# Patient Record
Sex: MALE | Race: WHITE | NOT HISPANIC OR LATINO | Employment: OTHER | URBAN - METROPOLITAN AREA
[De-identification: names, ages, dates, MRNs, and addresses within clinical notes are randomized per-mention and may not be internally consistent; named-entity substitution may affect disease eponyms.]

---

## 2017-05-16 ENCOUNTER — PRE VISIT (OUTPATIENT)
Dept: UROLOGY | Facility: CLINIC | Age: 52
End: 2017-05-16

## 2021-08-03 ENCOUNTER — APPOINTMENT (OUTPATIENT)
Dept: URBAN - METROPOLITAN AREA CLINIC 254 | Age: 56
Setting detail: DERMATOLOGY
End: 2021-08-03

## 2021-08-03 VITALS — HEIGHT: 71 IN | WEIGHT: 285 LBS

## 2021-08-03 DIAGNOSIS — L40.0 PSORIASIS VULGARIS: ICD-10-CM

## 2021-08-03 PROCEDURE — 99204 OFFICE O/P NEW MOD 45 MIN: CPT

## 2021-08-03 PROCEDURE — OTHER COUNSELING: OTHER

## 2021-08-03 PROCEDURE — OTHER PRESCRIPTION MEDICATION MANAGEMENT: OTHER

## 2021-08-03 PROCEDURE — OTHER MEDICATION COUNSELING: OTHER

## 2021-08-03 PROCEDURE — OTHER PRESCRIPTION: OTHER

## 2021-08-03 RX ORDER — DESONIDE 0.5 MG/G
0.05% CREAM TOPICAL BID
Qty: 1 | Refills: 1 | Status: ERX | COMMUNITY
Start: 2021-08-03

## 2021-08-03 RX ORDER — BETAMETHASONE DIPROPIONATE 0.5 MG/G
0.05% CREAM TOPICAL BID
Qty: 1 | Refills: 2 | Status: ERX | COMMUNITY
Start: 2021-08-03

## 2021-08-03 ASSESSMENT — LOCATION DETAILED DESCRIPTION DERM
LOCATION DETAILED: PERIUMBILICAL SKIN
LOCATION DETAILED: RIGHT PROXIMAL PRETIBIAL REGION
LOCATION DETAILED: LEFT PROXIMAL PRETIBIAL REGION
LOCATION DETAILED: LEFT PROXIMAL DORSAL FOREARM
LOCATION DETAILED: RIGHT PROXIMAL DORSAL FOREARM

## 2021-08-03 ASSESSMENT — LOCATION ZONE DERM
LOCATION ZONE: LEG
LOCATION ZONE: TRUNK
LOCATION ZONE: ARM

## 2021-08-03 ASSESSMENT — LOCATION SIMPLE DESCRIPTION DERM
LOCATION SIMPLE: LEFT FOREARM
LOCATION SIMPLE: ABDOMEN
LOCATION SIMPLE: LEFT PRETIBIAL REGION
LOCATION SIMPLE: RIGHT FOREARM
LOCATION SIMPLE: RIGHT PRETIBIAL REGION

## 2021-08-03 ASSESSMENT — BSA PSORIASIS: % BODY COVERED IN PSORIASIS: 30

## 2021-08-03 NOTE — HPI: RASH
Is The Patient Presenting As Previously Scheduled?: Yes
How Severe Is Your Rash?: moderate
Is This A New Presentation, Or A Follow-Up?: Rash
Additional History: Patient reports family history of psoriasis; brother.

## 2021-08-03 NOTE — PROCEDURE: PRESCRIPTION MEDICATION MANAGEMENT
Render In Strict Bullet Format?: No
Detail Level: Zone
Initiate Treatment: Betamethasone 0.05% Cream BID to affected areas. May Saran Wrap to plaques post application, only leaving on for 1 hour a day. Discussed avoiding any trauma to skin, including loofa use. Discussed doing the biopsy. Patient chooses to perform a biopsy later if worsens

## 2021-09-15 ENCOUNTER — APPOINTMENT (OUTPATIENT)
Dept: URBAN - METROPOLITAN AREA CLINIC 270 | Age: 56
Setting detail: DERMATOLOGY
End: 2021-09-15

## 2022-02-07 ENCOUNTER — APPOINTMENT (OUTPATIENT)
Dept: URBAN - METROPOLITAN AREA CLINIC 254 | Age: 57
Setting detail: DERMATOLOGY
End: 2022-02-08

## 2022-02-07 VITALS — WEIGHT: 275 LBS | RESPIRATION RATE: 14 BRPM

## 2022-02-07 DIAGNOSIS — L43.8 OTHER LICHEN PLANUS: ICD-10-CM

## 2022-02-07 PROCEDURE — 99214 OFFICE O/P EST MOD 30 MIN: CPT

## 2022-02-07 PROCEDURE — OTHER COUNSELING: OTHER

## 2022-02-07 PROCEDURE — OTHER MEDICATION COUNSELING: OTHER

## 2022-02-07 PROCEDURE — OTHER PRESCRIPTION: OTHER

## 2022-02-07 RX ORDER — DESONIDE 0.5 MG/G
0.05% CREAM TOPICAL
Qty: 15 | Refills: 3 | Status: ERX

## 2022-02-07 RX ORDER — BETAMETHASONE DIPROPIONATE 0.5 MG/G
CREAM TOPICAL
Qty: 45 | Refills: 5 | Status: ERX

## 2022-02-07 ASSESSMENT — LOCATION ZONE DERM: LOCATION ZONE: HAND

## 2022-02-07 ASSESSMENT — LOCATION DETAILED DESCRIPTION DERM
LOCATION DETAILED: 3RD WEB SPACE RIGHT HAND
LOCATION DETAILED: 3RD WEB SPACE LEFT HAND

## 2022-02-07 ASSESSMENT — LOCATION SIMPLE DESCRIPTION DERM
LOCATION SIMPLE: LEFT HAND
LOCATION SIMPLE: RIGHT HAND

## 2022-02-07 NOTE — PROCEDURE: MEDICATION COUNSELING
Patient Education     Prevention Guidelines, Men Ages 40 to 49  Screening tests and vaccines are an important part of managing your health. A screening test is done to find possible disorders or diseases in people who don't have any symptoms. The goal is to find a disease early so lifestyle changes can be made and you can be watched more closely to reduce the risk of disease, or to detect it early enough to treat it most effectively. Screening tests are not considered diagnostic, but are used to determine if more testing is needed. Health counseling is essential, too. Below are guidelines for these, for men ages 40 to 49. Talk with your healthcare provider to make sure you’re up to date on what you need.  Screening Who needs it How often   Alcohol misuse All men in this age group At routine exams   Blood pressure All men in this age group Yearly checkup if your blood pressure reading is normal  Normal blood pressure is less than 120/80 mm Hg  If your blood pressure is higher than normal, follow the advice of your healthcare provider      Depression All men in this age group At routine exams   Type 2 diabetes or prediabetes All men beginning at age 45 and men  without symptoms at any age who are overweight or obese and have 1 or more other risk factors for diabetes At least every 3 years (yearly if blood sugar has begun to rise)   Type 2 diabetes All men with prediabetes Every year   Hepatitis C Men at increased risk for infection - talk with your healthcare provider At routine exams   High cholesterol or triglycerides All men ages 35 and older, and younger men at high risk for coronary artery disease At least every 5 years   HIV All men At routine exams   Obesity All men in this age group At routine exams   Prostate cancer Starting at age 45, talk to healthcare provider about risks and benefits of digital rectal exam (DALTON) and prostate-specific antigen (PSA) screening1 At routine exams   Syphilis Men at increased  risk for infection - talk with your healthcare provider At routine exams   Tuberculosis Men at increased risk for infection - talk with your healthcare provider Check with your healthcare provider   Vision All men in this age group Every 2 to 4 years if no risk factors for eye disease2   Vaccine Who needs it How often   Chickenpox (varicella) All men in this age group who have no record of this infection or vaccine 2 doses; the second dose should be given at least 4 weeks after the first dose   Hepatitis A Men at increased risk for infection - talk with your healthcare provider 2 doses given at least 6 months apart   Hepatitis B Men at increased risk for infection - talk with your healthcare provider 3 doses over 6 months; second dose should be given 1 month after the first dose; the third dose should be given at least 2 months after the second dose and at least 4 months after the first dose   Haemophilus influenzae Type B (HIB) Men at increased risk for infection - talk with your healthcare provider 1 to 3 doses   Influenza (flu) All men in this age group Once a year   Measles, mumps, rubella (MMR) All men in this age group who have no record of these infections or vaccines 1 or 2 doses   Meningococcal Men at increased risk for infection - talk with your healthcare provider 1 or more doses   Pneumococcal conjugate vaccine (PCV13) and pneumococcal polysaccharide vaccine (PPSV23) Men at increased risk for infection - talk with your healthcare provider PCV13: 1 dose ages 19 to 65 (protects against 13 types of pneumococcal bacteria)     PPSV23: 1 to 2 doses through age 64, or 1 dose at 65 or older (protects against 23 types of pneumococcal bacteria)      Tetanus/diphtheria/  pertussis (Td/Tdap) booster All men in this age group Td every 10 years, or a one-time dose of Tdap instead of a Td booster after age 18, then Td every 10 years   Counseling Who needs it How often   Diet and exercise Men who are overweight or obese  When diagnosed, and then at routine exams   Sexually transmitted infection prevention Men at increased risk for infection - talk with your healthcare provider At routine exams   Use of daily aspirin Men ages 45 to 79 at risk for cardiovascular health problems At routine exams   Use of tobacco and the health effects it can cause All men in this age group Every exam   [address] [place] [address] [facility]   [facility] [employer] [facility] [employer]  Date Last Reviewed: 2/1/2017  © 9636-1122 [employer]. [address] [place] [address], [place] [address]. All rights reserved. This information is not intended as a substitute for professional medical care. Always follow your healthcare professional's instructions.            Terbinafine Pregnancy And Lactation Text: This medication is Pregnancy Category B and is considered safe during pregnancy. It is also excreted in breast milk and breast feeding isn't recommended.

## 2022-02-07 NOTE — HPI: RASH (PSORIASIS)
Is This A New Presentation, Or A Follow-Up?: Psoriasis
Additional History: Improved with desonide and betamethasone.  Uses these for 1 month and resolved and has not come back. Backs of hands started flaring 2 weeks ago and improved a little with desonide. Had exposure to hydolic fluid 3 weeks ago and flared after this.

## 2022-02-07 NOTE — PROCEDURE: COUNSELING
Detail Level: Detailed
Patient Specific Counseling (Will Not Stick From Patient To Patient): -Recommend pt uses the same topical steroid prescribed to him since affected areas were healing.\\n-Pt stated that the creams did work but ran out and wanted more.

## 2022-05-16 ENCOUNTER — APPOINTMENT (OUTPATIENT)
Dept: URBAN - METROPOLITAN AREA CLINIC 259 | Age: 57
Setting detail: DERMATOLOGY
End: 2022-05-16

## 2022-05-16 DIAGNOSIS — L29.8 OTHER PRURITUS: ICD-10-CM

## 2022-05-16 DIAGNOSIS — L21.8 OTHER SEBORRHEIC DERMATITIS: ICD-10-CM

## 2022-05-16 PROCEDURE — OTHER PRESCRIPTION: OTHER

## 2022-05-16 PROCEDURE — 99214 OFFICE O/P EST MOD 30 MIN: CPT

## 2022-05-16 PROCEDURE — OTHER MIPS QUALITY: OTHER

## 2022-05-16 PROCEDURE — OTHER COUNSELING: OTHER

## 2022-05-16 RX ORDER — HYDROCORTISONE 25 MG/G
CREAM TOPICAL
Qty: 2 | Refills: 1 | Status: ERX | COMMUNITY
Start: 2022-05-16

## 2022-05-16 RX ORDER — KETOCONAZOLE 20 MG/G
CREAM TOPICAL
Qty: 1 | Refills: 7 | Status: ERX | COMMUNITY
Start: 2022-05-16

## 2022-05-16 ASSESSMENT — LOCATION DETAILED DESCRIPTION DERM
LOCATION DETAILED: GLABELLA
LOCATION DETAILED: LEFT CENTRAL MALAR CHEEK

## 2022-05-16 ASSESSMENT — LOCATION SIMPLE DESCRIPTION DERM
LOCATION SIMPLE: GLABELLA
LOCATION SIMPLE: LEFT CHEEK

## 2022-05-16 ASSESSMENT — LOCATION ZONE DERM: LOCATION ZONE: FACE

## 2022-05-16 NOTE — HPI: RASH
What Type Of Note Output Would You Prefer (Optional)?: Standard Output
How Severe Is Your Rash?: moderate
Is This A New Presentation, Or A Follow-Up?: Rash
Additional History: Patient states a new rash appeared on his forehead about a week ago. It’s bothersome, and he is here seeking treatment options.

## 2024-02-11 ENCOUNTER — TRANSFERRED RECORDS (OUTPATIENT)
Dept: MULTI SPECIALTY CLINIC | Facility: CLINIC | Age: 59
End: 2024-02-11

## 2024-02-22 ENCOUNTER — MEDICAL CORRESPONDENCE (OUTPATIENT)
Dept: HEALTH INFORMATION MANAGEMENT | Facility: CLINIC | Age: 59
End: 2024-02-22
Payer: COMMERCIAL

## 2024-02-22 ENCOUNTER — HOSPITAL ENCOUNTER (INPATIENT)
Facility: CLINIC | Age: 59
Setting detail: SURGERY ADMIT
End: 2024-02-22
Attending: ORTHOPAEDIC SURGERY | Admitting: ORTHOPAEDIC SURGERY
Payer: COMMERCIAL

## 2024-02-29 ENCOUNTER — ANESTHESIA EVENT (OUTPATIENT)
Dept: SURGERY | Facility: CLINIC | Age: 59
End: 2024-02-29
Payer: COMMERCIAL

## 2024-02-29 RX ORDER — EZETIMIBE 10 MG/1
10 TABLET ORAL DAILY
COMMUNITY
End: 2024-02-29 | Stop reason: HOSPADM

## 2024-02-29 RX ORDER — LISINOPRIL 20 MG/1
20 TABLET ORAL DAILY
COMMUNITY
End: 2024-02-29 | Stop reason: HOSPADM

## 2024-02-29 RX ORDER — ASPIRIN 81 MG/1
81 TABLET ORAL DAILY
COMMUNITY
End: 2024-02-29 | Stop reason: HOSPADM

## 2024-02-29 RX ORDER — SODIUM CHLORIDE, SODIUM LACTATE, POTASSIUM CHLORIDE, CALCIUM CHLORIDE 600; 310; 30; 20 MG/100ML; MG/100ML; MG/100ML; MG/100ML
INJECTION, SOLUTION INTRAVENOUS CONTINUOUS
Status: CANCELLED | OUTPATIENT
Start: 2024-02-29

## 2024-02-29 RX ORDER — ROSUVASTATIN CALCIUM 20 MG/1
20 TABLET, COATED ORAL DAILY
COMMUNITY
End: 2024-02-29 | Stop reason: HOSPADM

## 2024-02-29 NOTE — PROGRESS NOTES
PTA medications updated by Medication Scribe prior to surgery via phone call with patient (last doses completed by Nurse)     Medication history sources: Patient and H&P  In the past week, patient estimated taking medication this percent of the time: Greater than 90%      Significant changes made to the medication list:  None      Additional medication history information:   None    Medication reconciliation completed by provider prior to medication history? No    Time spent in this activity: 30 minutes    The information provided in this note is only as accurate as the sources available at the time of update(s)      Prior to Admission medications    Medication Sig Last Dose Taking? Auth Provider Long Term End Date   aspirin 81 MG EC tablet Take 81 mg by mouth daily 02/24/2024 at am Yes Reported, Patient     ezetimibe (ZETIA) 10 MG tablet Take 10 mg by mouth daily 03/01/2024 at am Yes Reported, Patient Yes    lisinopril (ZESTRIL) 20 MG tablet Take 20 mg by mouth daily 02/29/2024 at am Yes Reported, Patient Yes    METOPROLOL TARTRATE PO Take 25 mg by mouth every morning 03/01/2024 at am Yes Reported, Patient Yes    Omega-3 Fatty Acids (OMEGA-3 FISH OIL PO)  More than a month Yes Reported, Patient     rosuvastatin (CRESTOR) 20 MG tablet Take 20 mg by mouth daily 03/01/2024 at am Yes Reported, Patient Yes    TAURINE PO  More than a month Yes Reported, Patient         Medication history completed by: Adrianna Marrero

## 2024-03-01 ENCOUNTER — ANESTHESIA (OUTPATIENT)
Dept: SURGERY | Facility: CLINIC | Age: 59
End: 2024-03-01
Payer: COMMERCIAL

## 2024-03-04 ENCOUNTER — APPOINTMENT (OUTPATIENT)
Dept: URBAN - METROPOLITAN AREA CLINIC 254 | Age: 59
Setting detail: DERMATOLOGY
End: 2024-03-04

## 2024-03-04 VITALS — HEIGHT: 71 IN | WEIGHT: 275 LBS

## 2024-03-04 DIAGNOSIS — L21.8 OTHER SEBORRHEIC DERMATITIS: ICD-10-CM

## 2024-03-04 DIAGNOSIS — L40.0 PSORIASIS VULGARIS: ICD-10-CM

## 2024-03-04 PROCEDURE — OTHER PHOTO-DOCUMENTATION: OTHER

## 2024-03-04 PROCEDURE — 99214 OFFICE O/P EST MOD 30 MIN: CPT

## 2024-03-04 PROCEDURE — OTHER PRESCRIPTION MEDICATION MANAGEMENT: OTHER

## 2024-03-04 PROCEDURE — OTHER PRESCRIPTION: OTHER

## 2024-03-04 PROCEDURE — OTHER COUNSELING: OTHER

## 2024-03-04 PROCEDURE — OTHER MEDICATION COUNSELING: OTHER

## 2024-03-04 PROCEDURE — OTHER MIPS QUALITY: OTHER

## 2024-03-04 RX ORDER — HYDROCORTISONE 25 MG/G
CREAM TOPICAL
Qty: 2 | Refills: 2 | Status: ERX

## 2024-03-04 RX ORDER — BETAMETHASONE DIPROPIONATE 0.5 MG/G
CREAM TOPICAL
Qty: 45 | Refills: 2 | Status: ERX

## 2024-03-04 RX ORDER — KETOCONAZOLE 20 MG/G
CREAM TOPICAL
Qty: 1 | Refills: 2 | Status: ERX

## 2024-03-04 ASSESSMENT — LOCATION SIMPLE DESCRIPTION DERM
LOCATION SIMPLE: RIGHT FOREHEAD
LOCATION SIMPLE: RIGHT FOREARM
LOCATION SIMPLE: LEFT CHEEK

## 2024-03-04 ASSESSMENT — LOCATION DETAILED DESCRIPTION DERM
LOCATION DETAILED: LEFT CENTRAL MALAR CHEEK
LOCATION DETAILED: RIGHT PROXIMAL DORSAL FOREARM
LOCATION DETAILED: RIGHT INFERIOR MEDIAL FOREHEAD
LOCATION DETAILED: RIGHT SUPERIOR LATERAL FOREHEAD

## 2024-03-04 ASSESSMENT — BSA PSORIASIS: % BODY COVERED IN PSORIASIS: 5

## 2024-03-04 ASSESSMENT — LOCATION ZONE DERM
LOCATION ZONE: FACE
LOCATION ZONE: ARM

## 2024-03-04 NOTE — PROCEDURE: PRESCRIPTION MEDICATION MANAGEMENT
Detail Level: Zone
Render In Strict Bullet Format?: No
Continue Regimen: ketoconazole 2 % topical cream w/ hydrocortisone 2.5% cream- mix in 1:1 ratio
Continue Regimen: betamethasone dipropionate 0.05 % cream BID for flares

## 2024-03-04 NOTE — PROCEDURE: MEDICATION COUNSELING
Propranolol Pregnancy And Lactation Text: This medication is Pregnancy Category C and it isn't known if it is safe during pregnancy. It is excreted in breast milk.
Wartpeel Pregnancy And Lactation Text: This medication is Pregnancy Category X and contraindicated in pregnancy and in women who may become pregnant. It is unknown if this medication is excreted in breast milk.
Erythromycin Pregnancy And Lactation Text: This medication is Pregnancy Category B and is considered safe during pregnancy. It is also excreted in breast milk.
Niacinamide Pregnancy And Lactation Text: These medications are considered safe during pregnancy.
Cyclosporine Pregnancy And Lactation Text: This medication is Pregnancy Category C and it isn't know if it is safe during pregnancy. This medication is excreted in breast milk.
Gabapentin Counseling: I discussed with the patient the risks of gabapentin including but not limited to dizziness, somnolence, fatigue and ataxia.
Cellcept Pregnancy And Lactation Text: This medication is Pregnancy Category D and isn't considered safe during pregnancy. It is unknown if this medication is excreted in breast milk.
Cimzia Counseling:  I discussed with the patient the risks of Cimzia including but not limited to immunosuppression, allergic reactions and infections.  The patient understands that monitoring is required including a PPD at baseline and must alert us or the primary physician if symptoms of infection or other concerning signs are noted.
Enbrel Counseling:  I discussed with the patient the risks of etanercept including but not limited to myelosuppression, immunosuppression, autoimmune hepatitis, demyelinating diseases, lymphoma, and infections.  The patient understands that monitoring is required including a PPD at baseline and must alert us or the primary physician if symptoms of infection or other concerning signs are noted.
Ivermectin Counseling:  Patient instructed to take medication on an empty stomach with a full glass of water.  Patient informed of potential adverse effects including but not limited to nausea, diarrhea, dizziness, itching, and swelling of the extremities or lymph nodes.  The patient verbalized understanding of the proper use and possible adverse effects of ivermectin.  All of the patient's questions and concerns were addressed.
Libtayo Counseling- I discussed with the patient the risks of Libtayo including but not limited to nausea, vomiting, diarrhea, and bone or muscle pain.  The patient verbalized understanding of the proper use and possible adverse effects of Libtayo.  All of the patient's questions and concerns were addressed.
Rifampin Pregnancy And Lactation Text: This medication is Pregnancy Category C and it isn't know if it is safe during pregnancy. It is also excreted in breast milk and should not be used if you are breast feeding.
Siliq Counseling:  I discussed with the patient the risks of Siliq including but not limited to new or worsening depression, suicidal thoughts and behavior, immunosuppression, malignancy, posterior leukoencephalopathy syndrome, and serious infections.  The patient understands that monitoring is required including a PPD at baseline and must alert us or the primary physician if symptoms of infection or other concerning signs are noted. There is also a special program designed to monitor depression which is required with Siliq.
Cellcept Counseling:  I discussed with the patient the risks of mycophenolate mofetil including but not limited to infection/immunosuppression, GI upset, hypokalemia, hypercholesterolemia, bone marrow suppression, lymphoproliferative disorders, malignancy, GI ulceration/bleed/perforation, colitis, interstitial lung disease, kidney failure, progressive multifocal leukoencephalopathy, and birth defects.  The patient understands that monitoring is required including a baseline creatinine and regular CBC testing. In addition, patient must alert us immediately if symptoms of infection or other concerning signs are noted.
Clofazimine Pregnancy And Lactation Text: This medication is Pregnancy Category C and isn't considered safe during pregnancy. It is excreted in breast milk.
Xolair Pregnancy And Lactation Text: This medication is Pregnancy Category B and is considered safe during pregnancy. This medication is excreted in breast milk.
Odomzo Counseling- I discussed with the patient the risks of Odomzo including but not limited to nausea, vomiting, diarrhea, constipation, weight loss, changes in the sense of taste, decreased appetite, muscle spasms, and hair loss.  The patient verbalized understanding of the proper use and possible adverse effects of Odomzo.  All of the patient's questions and concerns were addressed.
Valtrex Counseling: I discussed with the patient the risks of valacyclovir including but not limited to kidney damage, nausea, vomiting and severe allergy.  The patient understands that if the infection seems to be worsening or is not improving, they are to call.
Thalidomide Counseling: I discussed with the patient the risks of thalidomide including but not limited to birth defects, anxiety, weakness, chest pain, dizziness, cough and severe allergy.
Benzoyl Peroxide Pregnancy And Lactation Text: This medication is Pregnancy Category C. It is unknown if benzoyl peroxide is excreted in breast milk.
Nsaids Counseling: NSAID Counseling: I discussed with the patient that NSAIDs should be taken with food. Prolonged use of NSAIDs can result in the development of stomach ulcers.  Patient advised to stop taking NSAIDs if abdominal pain occurs.  The patient verbalized understanding of the proper use and possible adverse effects of NSAIDs.  All of the patient's questions and concerns were addressed.
Birth Control Pills Pregnancy And Lactation Text: This medication should be avoided if pregnant and for the first 30 days post-partum.
Topical Clindamycin Counseling: Patient counseled that this medication may cause skin irritation or allergic reactions.  In the event of skin irritation, the patient was advised to reduce the amount of the drug applied or use it less frequently.   The patient verbalized understanding of the proper use and possible adverse effects of clindamycin.  All of the patient's questions and concerns were addressed.
Odomzo Pregnancy And Lactation Text: This medication is Pregnancy Category X and is absolutely contraindicated during pregnancy. It is unknown if it is excreted in breast milk.
Picato Counseling:  I discussed with the patient the risks of Picato including but not limited to erythema, scaling, itching, weeping, crusting, and pain.
Protopic Counseling: Patient may experience a mild burning sensation during topical application. Protopic is not approved in children less than 2 years of age. There have been case reports of hematologic and skin malignancies in patients using topical calcineurin inhibitors although causality is questionable.
Rituxan Counseling:  I discussed with the patient the risks of Rituxan infusions. Side effects can include infusion reactions, severe drug rashes including mucocutaneous reactions, reactivation of latent hepatitis and other infections and rarely progressive multifocal leukoencephalopathy.  All of the patient's questions and concerns were addressed.
Sarecycline Counseling: Patient advised regarding possible photosensitivity and discoloration of the teeth, skin, lips, tongue and gums.  Patient instructed to avoid sunlight, if possible.  When exposed to sunlight, patients should wear protective clothing, sunglasses, and sunscreen.  The patient was instructed to call the office immediately if the following severe adverse effects occur:  hearing changes, easy bruising/bleeding, severe headache, or vision changes.  The patient verbalized understanding of the proper use and possible adverse effects of sarecycline.  All of the patient's questions and concerns were addressed.
Topical Retinoid Pregnancy And Lactation Text: This medication is Pregnancy Category C. It is unknown if this medication is excreted in breast milk.
Azithromycin Counseling:  I discussed with the patient the risks of azithromycin including but not limited to GI upset, allergic reaction, drug rash, diarrhea, and yeast infections.
Nsaids Pregnancy And Lactation Text: These medications are considered safe up to 30 weeks gestation. It is excreted in breast milk.
Cephalexin Pregnancy And Lactation Text: This medication is Pregnancy Category B and considered safe during pregnancy.  It is also excreted in breast milk but can be used safely for shorter doses.
Otezla Pregnancy And Lactation Text: This medication is Pregnancy Category C and it isn't known if it is safe during pregnancy. It is unknown if it is excreted in breast milk.
Clofazimine Counseling:  I discussed with the patient the risks of clofazimine including but not limited to skin and eye pigmentation, liver damage, nausea/vomiting, gastrointestinal bleeding and allergy.
Oxybutynin Counseling:  I discussed with the patient the risks of oxybutynin including but not limited to skin rash, drowsiness, dry mouth, difficulty urinating, and blurred vision.
High Dose Vitamin A Pregnancy And Lactation Text: High dose vitamin A therapy is contraindicated during pregnancy and breast feeding.
Use Enhanced Medication Counseling?: No
Itraconazole Pregnancy And Lactation Text: This medication is Pregnancy Category C and it isn't know if it is safe during pregnancy. It is also excreted in breast milk.
Erythromycin Counseling:  I discussed with the patient the risks of erythromycin including but not limited to GI upset, allergic reaction, drug rash, diarrhea, increase in liver enzymes, and yeast infections.
Clindamycin Pregnancy And Lactation Text: This medication can be used in pregnancy if certain situations. Clindamycin is also present in breast milk.
Spironolactone Counseling: Patient advised regarding risks of diarrhea, abdominal pain, hyperkalemia, birth defects (for female patients), liver toxicity and renal toxicity. The patient may need blood work to monitor liver and kidney function and potassium levels while on therapy. The patient verbalized understanding of the proper use and possible adverse effects of spironolactone.  All of the patient's questions and concerns were addressed.
Terbinafine Pregnancy And Lactation Text: This medication is Pregnancy Category B and is considered safe during pregnancy. It is also excreted in breast milk and breast feeding isn't recommended.
Cyclosporine Counseling:  I discussed with the patient the risks of cyclosporine including but not limited to hypertension, gingival hyperplasia,myelosuppression, immunosuppression, liver damage, kidney damage, neurotoxicity, lymphoma, and serious infections. The patient understands that monitoring is required including baseline blood pressure, CBC, CMP, lipid panel and uric acid, and then 1-2 times monthly CMP and blood pressure.
Clindamycin Counseling: I counseled the patient regarding use of clindamycin as an antibiotic for prophylactic and/or therapeutic purposes. Clindamycin is active against numerous classes of bacteria, including skin bacteria. Side effects may include nausea, diarrhea, gastrointestinal upset, rash, hives, yeast infections, and in rare cases, colitis.
Tremfya Counseling: I discussed with the patient the risks of guselkumab including but not limited to immunosuppression, serious infections, and drug reactions.  The patient understands that monitoring is required including a PPD at baseline and must alert us or the primary physician if symptoms of infection or other concerning signs are noted.
Spironolactone Pregnancy And Lactation Text: This medication can cause feminization of the male fetus and should be avoided during pregnancy. The active metabolite is also found in breast milk.
Tazorac Counseling:  Patient advised that medication is irritating and drying.  Patient may need to apply sparingly and wash off after an hour before eventually leaving it on overnight.  The patient verbalized understanding of the proper use and possible adverse effects of tazorac.  All of the patient's questions and concerns were addressed.
Protopic Pregnancy And Lactation Text: This medication is Pregnancy Category C. It is unknown if this medication is excreted in breast milk when applied topically.
Bactrim Pregnancy And Lactation Text: This medication is Pregnancy Category D and is known to cause fetal risk.  It is also excreted in breast milk.
Cosentyx Counseling:  I discussed with the patient the risks of Cosentyx including but not limited to worsening of Crohn's disease, immunosuppression, allergic reactions and infections.  The patient understands that monitoring is required including a PPD at baseline and must alert us or the primary physician if symptoms of infection or other concerning signs are noted.
Hydroxychloroquine Counseling:  I discussed with the patient that a baseline ophthalmologic exam is needed at the start of therapy and every year thereafter while on therapy. A CBC may also be warranted for monitoring.  The side effects of this medication were discussed with the patient, including but not limited to agranulocytosis, aplastic anemia, seizures, rashes, retinopathy, and liver toxicity. Patient instructed to call the office should any adverse effect occur.  The patient verbalized understanding of the proper use and possible adverse effects of Plaquenil.  All the patient's questions and concerns were addressed.
Rifampin Counseling: I discussed with the patient the risks of rifampin including but not limited to liver damage, kidney damage, red-orange body fluids, nausea/vomiting and severe allergy.
Otezla Counseling: The side effects of Otezla were discussed with the patient, including but not limited to worsening or new depression, weight loss, diarrhea, nausea, upper respiratory tract infection, and headache. Patient instructed to call the office should any adverse effect occur.  The patient verbalized understanding of the proper use and possible adverse effects of Otezla.  All the patient's questions and concerns were addressed.
Griseofulvin Counseling:  I discussed with the patient the risks of griseofulvin including but not limited to photosensitivity, cytopenia, liver damage, nausea/vomiting and severe allergy.  The patient understands that this medication is best absorbed when taken with a fatty meal (e.g., ice cream or french fries).
Xolair Counseling:  Patient informed of potential adverse effects including but not limited to fever, muscle aches, rash and allergic reactions.  The patient verbalized understanding of the proper use and possible adverse effects of Xolair.  All of the patient's questions and concerns were addressed.
Tremfya Pregnancy And Lactation Text: The risk during pregnancy and breastfeeding is uncertain with this medication.
Tetracycline Pregnancy And Lactation Text: This medication is Pregnancy Category D and not consider safe during pregnancy. It is also excreted in breast milk.
Solaraze Pregnancy And Lactation Text: This medication is Pregnancy Category B and is considered safe. There is some data to suggest avoiding during the third trimester. It is unknown if this medication is excreted in breast milk.
Wartpeel Counseling:  I discussed with the patient the risks of Wartpeel including but not limited to erythema, scaling, itching, weeping, crusting, and pain.
Doxycycline Counseling:  Patient counseled regarding possible photosensitivity and increased risk for sunburn.  Patient instructed to avoid sunlight, if possible.  When exposed to sunlight, patients should wear protective clothing, sunglasses, and sunscreen.  The patient was instructed to call the office immediately if the following severe adverse effects occur:  hearing changes, easy bruising/bleeding, severe headache, or vision changes.  The patient verbalized understanding of the proper use and possible adverse effects of doxycycline.  All of the patient's questions and concerns were addressed.
Acitretin Counseling:  I discussed with the patient the risks of acitretin including but not limited to hair loss, dry lips/skin/eyes, liver damage, hyperlipidemia, depression/suicidal ideation, photosensitivity.  Serious rare side effects can include but are not limited to pancreatitis, pseudotumor cerebri, bony changes, clot formation/stroke/heart attack.  Patient understands that alcohol is contraindicated since it can result in liver toxicity and significantly prolong the elimination of the drug by many years.
Tetracycline Counseling: Patient counseled regarding possible photosensitivity and increased risk for sunburn.  Patient instructed to avoid sunlight, if possible.  When exposed to sunlight, patients should wear protective clothing, sunglasses, and sunscreen.  The patient was instructed to call the office immediately if the following severe adverse effects occur:  hearing changes, easy bruising/bleeding, severe headache, or vision changes.  The patient verbalized understanding of the proper use and possible adverse effects of tetracycline.  All of the patient's questions and concerns were addressed. Patient understands to avoid pregnancy while on therapy due to potential birth defects.
Taltz Counseling: I discussed with the patient the risks of ixekizumab including but not limited to immunosuppression, serious infections, worsening of inflammatory bowel disease and drug reactions.  The patient understands that monitoring is required including a PPD at baseline and must alert us or the primary physician if symptoms of infection or other concerning signs are noted.
Calcipotriene Pregnancy And Lactation Text: This medication has not been proven safe during pregnancy. It is unknown if this medication is excreted in breast milk.
Isotretinoin Counseling: Patient should get monthly blood tests, not donate blood, not drive at night if vision affected, not share medication, and not undergo elective surgery for 6 months after tx completed. Side effects reviewed, pt to contact office should one occur.
Drysol Pregnancy And Lactation Text: This medication is considered safe during pregnancy and breast feeding.
Cyclophosphamide Pregnancy And Lactation Text: This medication is Pregnancy Category D and it isn't considered safe during pregnancy. This medication is excreted in breast milk.
Minoxidil Pregnancy And Lactation Text: This medication has not been assigned a Pregnancy Risk Category but animal studies failed to show danger with the topical medication. It is unknown if the medication is excreted in breast milk.
Ivermectin Pregnancy And Lactation Text: This medication is Pregnancy Category C and it isn't known if it is safe during pregnancy. It is also excreted in breast milk.
Ketoconazole Counseling:   Patient counseled regarding improving absorption with orange juice.  Adverse effects include but are not limited to breast enlargement, headache, diarrhea, nausea, upset stomach, liver function test abnormalities, taste disturbance, and stomach pain.  There is a rare possibility of liver failure that can occur when taking ketoconazole. The patient understands that monitoring of LFTs may be required, especially at baseline. The patient verbalized understanding of the proper use and possible adverse effects of ketoconazole.  All of the patient's questions and concerns were addressed.
Acitretin Pregnancy And Lactation Text: This medication is Pregnancy Category X and should not be given to women who are pregnant or may become pregnant in the future. This medication is excreted in breast milk.
Humira Pregnancy And Lactation Text: This medication is Pregnancy Category B and is considered safe during pregnancy. It is unknown if this medication is excreted in breast milk.
Detail Level: Simple
Arava Counseling:  Patient counseled regarding adverse effects of Arava including but not limited to nausea, vomiting, abnormalities in liver function tests. Patients may develop mouth sores, rash, diarrhea, and abnormalities in blood counts. The patient understands that monitoring is required including LFTs and blood counts.  There is a rare possibility of scarring of the liver and lung problems that can occur when taking methotrexate. Persistent nausea, loss of appetite, pale stools, dark urine, cough, and shortness of breath should be reported immediately. Patient advised to discontinue Arava treatment and consult with a physician prior to attempting conception. The patient will have to undergo a treatment to eliminate Arava from the body prior to conception.
Azathioprine Counseling:  I discussed with the patient the risks of azathioprine including but not limited to myelosuppression, immunosuppression, hepatotoxicity, lymphoma, and infections.  The patient understands that monitoring is required including baseline LFTs, Creatinine, possible TPMP genotyping and weekly CBCs for the first month and then every 2 weeks thereafter.  The patient verbalized understanding of the proper use and possible adverse effects of azathioprine.  All of the patient's questions and concerns were addressed.
Tranexamic Acid Pregnancy And Lactation Text: It is unknown if this medication is safe during pregnancy or breast feeding.
Hydroxyzine Pregnancy And Lactation Text: This medication is not safe during pregnancy and should not be taken. It is also excreted in breast milk and breast feeding isn't recommended.
Prednisone Counseling:  I discussed with the patient the risks of prolonged use of prednisone including but not limited to weight gain, insomnia, osteoporosis, mood changes, diabetes, susceptibility to infection, glaucoma and high blood pressure.  In cases where prednisone use is prolonged, patients should be monitored with blood pressure checks, serum glucose levels and an eye exam.  Additionally, the patient may need to be placed on GI prophylaxis, PCP prophylaxis, and calcium and vitamin D supplementation and/or a bisphosphonate.  The patient verbalized understanding of the proper use and the possible adverse effects of prednisone.  All of the patient's questions and concerns were addressed.
Drysol Counseling:  I discussed with the patient the risks of drysol/aluminum chloride including but not limited to skin rash, itching, irritation, burning.
Humira Counseling:  I discussed with the patient the risks of adalimumab including but not limited to myelosuppression, immunosuppression, autoimmune hepatitis, demyelinating diseases, lymphoma, and serious infections.  The patient understands that monitoring is required including a PPD at baseline and must alert us or the primary physician if symptoms of infection or other concerning signs are noted.
Ilumya Counseling: I discussed with the patient the risks of tildrakizumab including but not limited to immunosuppression, malignancy, posterior leukoencephalopathy syndrome, and serious infections.  The patient understands that monitoring is required including a PPD at baseline and must alert us or the primary physician if symptoms of infection or other concerning signs are noted.
Valtrex Pregnancy And Lactation Text: this medication is Pregnancy Category B and is considered safe during pregnancy. This medication is not directly found in breast milk but it's metabolite acyclovir is present.
Dupixent Pregnancy And Lactation Text: This medication likely crosses the placenta but the risk for the fetus is uncertain. This medication is excreted in breast milk.
Eucrisa Counseling: Patient may experience a mild burning sensation during topical application. Eucrisa is not approved in children less than 2 years of age.
High Dose Vitamin A Counseling: Side effects reviewed, pt to contact office should one occur.
Azithromycin Pregnancy And Lactation Text: This medication is considered safe during pregnancy and is also secreted in breast milk.
Doxepin Pregnancy And Lactation Text: This medication is Pregnancy Category C and it isn't known if it is safe during pregnancy. It is also excreted in breast milk and breast feeding isn't recommended.
Stelara Counseling:  I discussed with the patient the risks of ustekinumab including but not limited to immunosuppression, malignancy, posterior leukoencephalopathy syndrome, and serious infections.  The patient understands that monitoring is required including a PPD at baseline and must alert us or the primary physician if symptoms of infection or other concerning signs are noted.
Finasteride Male Counseling: Finasteride Counseling:  I discussed with the patient the risks of use of finasteride including but not limited to decreased libido, decreased ejaculate volume, gynecomastia, and depression. Women should not handle medication.  All of the patient's questions and concerns were addressed.
Benzoyl Peroxide Counseling: Patient counseled that medicine may cause skin irritation and bleach clothing.  In the event of skin irritation, the patient was advised to reduce the amount of the drug applied or use it less frequently.   The patient verbalized understanding of the proper use and possible adverse effects of benzoyl peroxide.  All of the patient's questions and concerns were addressed.
Cyclophosphamide Counseling:  I discussed with the patient the risks of cyclophosphamide including but not limited to hair loss, hormonal abnormalities, decreased fertility, abdominal pain, diarrhea, nausea and vomiting, bone marrow suppression and infection. The patient understands that monitoring is required while taking this medication.
Libtayo Pregnancy And Lactation Text: This medication is contraindicated in pregnancy and when breast feeding.
Minocycline Counseling: Patient advised regarding possible photosensitivity and discoloration of the teeth, skin, lips, tongue and gums.  Patient instructed to avoid sunlight, if possible.  When exposed to sunlight, patients should wear protective clothing, sunglasses, and sunscreen.  The patient was instructed to call the office immediately if the following severe adverse effects occur:  hearing changes, easy bruising/bleeding, severe headache, or vision changes.  The patient verbalized understanding of the proper use and possible adverse effects of minocycline.  All of the patient's questions and concerns were addressed.
Rituxan Pregnancy And Lactation Text: This medication is Pregnancy Category C and it isn't know if it is safe during pregnancy. It is unknown if this medication is excreted in breast milk but similar antibodies are known to be excreted.
Tranexamic Acid Counseling:  Patient advised of the small risk of bleeding problems with tranexamic acid. They were also instructed to call if they developed any nausea, vomiting or diarrhea. All of the patient's questions and concerns were addressed.
Methotrexate Counseling:  Patient counseled regarding adverse effects of methotrexate including but not limited to nausea, vomiting, abnormalities in liver function tests. Patients may develop mouth sores, rash, diarrhea, and abnormalities in blood counts. The patient understands that monitoring is required including LFT's and blood counts.  There is a rare possibility of scarring of the liver and lung problems that can occur when taking methotrexate. Persistent nausea, loss of appetite, pale stools, dark urine, cough, and shortness of breath should be reported immediately. Patient advised to discontinue methotrexate treatment at least three months before attempting to become pregnant.  I discussed the need for folate supplements while taking methotrexate.  These supplements can decrease side effects during methotrexate treatment. The patient verbalized understanding of the proper use and possible adverse effects of methotrexate.  All of the patient's questions and concerns were addressed.
Colchicine Counseling:  Patient counseled regarding adverse effects including but not limited to stomach upset (nausea, vomiting, stomach pain, or diarrhea).  Patient instructed to limit alcohol consumption while taking this medication.  Colchicine may reduce blood counts especially with prolonged use.  The patient understands that monitoring of kidney function and blood counts may be required, especially at baseline. The patient verbalized understanding of the proper use and possible adverse effects of colchicine.  All of the patient's questions and concerns were addressed.
Xeljanz Counseling: I discussed with the patient the risks of Xeljanz therapy including increased risk of infection, liver issues, headache, diarrhea, or cold symptoms. Live vaccines should be avoided. They were instructed to call if they have any problems.
Imiquimod Counseling:  I discussed with the patient the risks of imiquimod including but not limited to erythema, scaling, itching, weeping, crusting, and pain.  Patient understands that the inflammatory response to imiquimod is variable from person to person and was educated regarded proper titration schedule.  If flu-like symptoms develop, patient knows to discontinue the medication and contact us.
Niacinamide Counseling: I recommended taking niacin or niacinamide, also know as vitamin B3, twice daily. Recent evidence suggests that taking vitamin B3 (500 mg twice daily) can reduce the risk of actinic keratoses and non-melanoma skin cancers. Side effects of vitamin B3 include flushing and headache.
Xeldamianz Pregnancy And Lactation Text: This medication is Pregnancy Category D and is not considered safe during pregnancy.  The risk during breast feeding is also uncertain.
Methotrexate Pregnancy And Lactation Text: This medication is Pregnancy Category X and is known to cause fetal harm. This medication is excreted in breast milk.
Griseofulvin Pregnancy And Lactation Text: This medication is Pregnancy Category X and is known to cause serious birth defects. It is unknown if this medication is excreted in breast milk but breast feeding should be avoided.
Mirvaso Pregnancy And Lactation Text: This medication has not been assigned a Pregnancy Risk Category. It is unknown if the medication is excreted in breast milk.
Dapsone Pregnancy And Lactation Text: This medication is Pregnancy Category C and is not considered safe during pregnancy or breast feeding.
Erivedge Counseling- I discussed with the patient the risks of Erivedge including but not limited to nausea, vomiting, diarrhea, constipation, weight loss, changes in the sense of taste, decreased appetite, muscle spasms, and hair loss.  The patient verbalized understanding of the proper use and possible adverse effects of Erivedge.  All of the patient's questions and concerns were addressed.
Opioid Pregnancy And Lactation Text: These medications can lead to premature delivery and should be avoided during pregnancy. These medications are also present in breast milk in small amounts.
Bexarotene Pregnancy And Lactation Text: This medication is Pregnancy Category X and should not be given to women who are pregnant or may become pregnant. This medication should not be used if you are breast feeding.
Hydroxyzine Counseling: Patient advised that the medication is sedating and not to drive a car after taking this medication.  Patient informed of potential adverse effects including but not limited to dry mouth, urinary retention, and blurry vision.  The patient verbalized understanding of the proper use and possible adverse effects of hydroxyzine.  All of the patient's questions and concerns were addressed.
Zyclara Counseling:  I discussed with the patient the risks of imiquimod including but not limited to erythema, scaling, itching, weeping, crusting, and pain.  Patient understands that the inflammatory response to imiquimod is variable from person to person and was educated regarded proper titration schedule.  If flu-like symptoms develop, patient knows to discontinue the medication and contact us.
Metronidazole Pregnancy And Lactation Text: This medication is Pregnancy Category B and considered safe during pregnancy.  It is also excreted in breast milk.
Hydroquinone Counseling:  Patient advised that medication may result in skin irritation, lightening (hypopigmentation), dryness, and burning.  In the event of skin irritation, the patient was advised to reduce the amount of the drug applied or use it less frequently.  Rarely, spots that are treated with hydroquinone can become darker (pseudoochronosis).  Should this occur, patient instructed to stop medication and call the office. The patient verbalized understanding of the proper use and possible adverse effects of hydroquinone.  All of the patient's questions and concerns were addressed.
Glycopyrrolate Pregnancy And Lactation Text: This medication is Pregnancy Category B and is considered safe during pregnancy. It is unknown if it is excreted breast milk.
Simponi Counseling:  I discussed with the patient the risks of golimumab including but not limited to myelosuppression, immunosuppression, autoimmune hepatitis, demyelinating diseases, lymphoma, and serious infections.  The patient understands that monitoring is required including a PPD at baseline and must alert us or the primary physician if symptoms of infection or other concerning signs are noted.
Cimzia Pregnancy And Lactation Text: This medication crosses the placenta but can be considered safe in certain situations. Cimzia may be excreted in breast milk.
Solaraze Counseling:  I discussed with the patient the risks of Solaraze including but not limited to erythema, scaling, itching, weeping, crusting, and pain.
Skyrizi Counseling: I discussed with the patient the risks of risankizumab-rzaa including but not limited to immunosuppression, and serious infections.  The patient understands that monitoring is required including a PPD at baseline and must alert us or the primary physician if symptoms of infection or other concerning signs are noted.
Cimetidine Counseling:  I discussed with the patient the risks of Cimetidine including but not limited to gynecomastia, headache, diarrhea, nausea, drowsiness, arrhythmias, pancreatitis, skin rashes, psychosis, bone marrow suppression and kidney toxicity.
Quinolones Counseling:  I discussed with the patient the risks of fluoroquinolones including but not limited to GI upset, allergic reaction, drug rash, diarrhea, dizziness, photosensitivity, yeast infections, liver function test abnormalities, tendonitis/tendon rupture.
Birth Control Pills Counseling: Birth Control Pill Counseling: I discussed with the patient the potential side effects of OCPs including but not limited to increased risk of stroke, heart attack, thrombophlebitis, deep venous thrombosis, hepatic adenomas, breast changes, GI upset, headaches, and depression.  The patient verbalized understanding of the proper use and possible adverse effects of OCPs. All of the patient's questions and concerns were addressed.
Doxepin Counseling:  Patient advised that the medication is sedating and not to drive a car after taking this medication. Patient informed of potential adverse effects including but not limited to dry mouth, urinary retention, and blurry vision.  The patient verbalized understanding of the proper use and possible adverse effects of doxepin.  All of the patient's questions and concerns were addressed.
Cephalexin Counseling: I counseled the patient regarding use of cephalexin as an antibiotic for prophylactic and/or therapeutic purposes. Cephalexin (commonly prescribed under brand name Keflex) is a cephalosporin antibiotic which is active against numerous classes of bacteria, including most skin bacteria. Side effects may include nausea, diarrhea, gastrointestinal upset, rash, hives, yeast infections, and in rare cases, hepatitis, kidney disease, seizures, fever, confusion, neurologic symptoms, and others. Patients with severe allergies to penicillin medications are cautioned that there is about a 10% incidence of cross-reactivity with cephalosporins. When possible, patients with penicillin allergies should use alternatives to cephalosporins for antibiotic therapy.
Glycopyrrolate Counseling:  I discussed with the patient the risks of glycopyrrolate including but not limited to skin rash, drowsiness, dry mouth, difficulty urinating, and blurred vision.
Dapsone Counseling: I discussed with the patient the risks of dapsone including but not limited to hemolytic anemia, agranulocytosis, rashes, methemoglobinemia, kidney failure, peripheral neuropathy, headaches, GI upset, and liver toxicity.  Patients who start dapsone require monitoring including baseline LFTs and weekly CBCs for the first month, then every month thereafter.  The patient verbalized understanding of the proper use and possible adverse effects of dapsone.  All of the patient's questions and concerns were addressed.
Calcipotriene Counseling:  I discussed with the patient the risks of calcipotriene including but not limited to erythema, scaling, itching, and irritation.
Infliximab Counseling:  I discussed with the patient the risks of infliximab including but not limited to myelosuppression, immunosuppression, autoimmune hepatitis, demyelinating diseases, lymphoma, and serious infections.  The patient understands that monitoring is required including a PPD at baseline and must alert us or the primary physician if symptoms of infection or other concerning signs are noted.
SSKI Counseling:  I discussed with the patient the risks of SSKI including but not limited to thyroid abnormalities, metallic taste, GI upset, fever, headache, acne, arthralgias, paraesthesias, lymphadenopathy, easy bleeding, arrhythmias, and allergic reaction.
Opioid Counseling: I discussed with the patient the potential side effects of opioids including but not limited to addiction, altered mental status, and depression. I stressed avoiding alcohol, benzodiazepines, muscle relaxants and sleep aids unless specifically okayed by a physician. The patient verbalized understanding of the proper use and possible adverse effects of opioids. All of the patient's questions and concerns were addressed. They were instructed to flush the remaining pills down the toilet if they did not need them for pain.
Finasteride Pregnancy And Lactation Text: This medication is absolutely contraindicated during pregnancy. It is unknown if it is excreted in breast milk.
Dupixent Counseling: I discussed with the patient the risks of dupilumab including but not limited to eye infection and irritation, cold sores, injection site reactions, worsening of asthma, allergic reactions and increased risk of parasitic infection.  Live vaccines should be avoided while taking dupilumab. Dupilumab will also interact with certain medications such as warfarin and cyclosporine. The patient understands that monitoring is required and they must alert us or the primary physician if symptoms of infection or other concerning signs are noted.
Terbinafine Counseling: Patient counseling regarding adverse effects of terbinafine including but not limited to headache, diarrhea, rash, upset stomach, liver function test abnormalities, itching, taste/smell disturbance, nausea, abdominal pain, and flatulence.  There is a rare possibility of liver failure that can occur when taking terbinafine.  The patient understands that a baseline LFT and kidney function test may be required. The patient verbalized understanding of the proper use and possible adverse effects of terbinafine.  All of the patient's questions and concerns were addressed.
Topical Sulfur Applications Counseling: Topical Sulfur Counseling: Patient counseled that this medication may cause skin irritation or allergic reactions.  In the event of skin irritation, the patient was advised to reduce the amount of the drug applied or use it less frequently.   The patient verbalized understanding of the proper use and possible adverse effects of topical sulfur application.  All of the patient's questions and concerns were addressed.
Doxycycline Pregnancy And Lactation Text: This medication is Pregnancy Category D and not consider safe during pregnancy. It is also excreted in breast milk but is considered safe for shorter treatment courses.
Itraconazole Counseling:  I discussed with the patient the risks of itraconazole including but not limited to liver damage, nausea/vomiting, neuropathy, and severe allergy.  The patient understands that this medication is best absorbed when taken with acidic beverages such as non-diet cola or ginger ale.  The patient understands that monitoring is required including baseline LFTs and repeat LFTs at intervals.  The patient understands that they are to contact us or the primary physician if concerning signs are noted.
5-Fu Counseling: 5-Fluorouracil Counseling:  I discussed with the patient the risks of 5-fluorouracil including but not limited to erythema, scaling, itching, weeping, crusting, and pain.
Bexarotene Counseling:  I discussed with the patient the risks of bexarotene including but not limited to hair loss, dry lips/skin/eyes, liver abnormalities, hyperlipidemia, pancreatitis, depression/suicidal ideation, photosensitivity, drug rash/allergic reactions, hypothyroidism, anemia, leukopenia, infection, cataracts, and teratogenicity.  Patient understands that they will need regular blood tests to check lipid profile, liver function tests, white blood cell count, thyroid function tests and pregnancy test if applicable.
Metronidazole Counseling:  I discussed with the patient the risks of metronidazole including but not limited to seizures, nausea/vomiting, a metallic taste in the mouth, nausea/vomiting and severe allergy.
Isotretinoin Pregnancy And Lactation Text: This medication is Pregnancy Category X and is considered extremely dangerous during pregnancy. It is unknown if it is excreted in breast milk.
Sski Pregnancy And Lactation Text: This medication is Pregnancy Category D and isn't considered safe during pregnancy. It is excreted in breast milk.
Mirvaso Counseling: Mirvaso is a topical medication which can decrease superficial blood flow where applied. Side effects are uncommon and include stinging, redness and allergic reactions.
Oxybutynin Pregnancy And Lactation Text: This medication is Pregnancy Category B and is considered safe during pregnancy. It is unknown if it is excreted in breast milk.
Albendazole Counseling:  I discussed with the patient the risks of albendazole including but not limited to cytopenia, kidney damage, nausea/vomiting and severe allergy.  The patient understands that this medication is being used in an off-label manner.
Elidel Counseling: Patient may experience a mild burning sensation during topical application. Elidel is not approved in children less than 2 years of age. There have been case reports of hematologic and skin malignancies in patients using topical calcineurin inhibitors although causality is questionable.
Propranolol Counseling:  I discussed with the patient the risks of propranolol including but not limited to low heart rate, low blood pressure, low blood sugar, restlessness and increased cold sensitivity. They should call the office if they experience any of these side effects.
Bactrim Counseling:  I discussed with the patient the risks of sulfa antibiotics including but not limited to GI upset, allergic reaction, drug rash, diarrhea, dizziness, photosensitivity, and yeast infections.  Rarely, more serious reactions can occur including but not limited to aplastic anemia, agranulocytosis, methemoglobinemia, blood dyscrasias, liver or kidney failure, lung infiltrates or desquamative/blistering drug rashes.
Ketoconazole Pregnancy And Lactation Text: This medication is Pregnancy Category C and it isn't know if it is safe during pregnancy. It is also excreted in breast milk and breast feeding isn't recommended.
Minoxidil Counseling: Minoxidil is a topical medication which can increase blood flow where it is applied. It is uncertain how this medication increases hair growth. Side effects are uncommon and include stinging and allergic reactions.
Hydroxychloroquine Pregnancy And Lactation Text: This medication has been shown to cause fetal harm but it isn't assigned a Pregnancy Risk Category. There are small amounts excreted in breast milk.
Tazorac Pregnancy And Lactation Text: This medication is not safe during pregnancy. It is unknown if this medication is excreted in breast milk.
Topical Retinoid counseling:  Patient advised to apply a pea-sized amount only at bedtime and wait 30 minutes after washing their face before applying.  If too drying, patient may add a non-comedogenic moisturizer. The patient verbalized understanding of the proper use and possible adverse effects of retinoids.  All of the patient's questions and concerns were addressed.
Carac Counseling:  I discussed with the patient the risks of Carac including but not limited to erythema, scaling, itching, weeping, crusting, and pain.
Topical Sulfur Applications Pregnancy And Lactation Text: This medication is Pregnancy Category C and has an unknown safety profile during pregnancy. It is unknown if this topical medication is excreted in breast milk.
Fluconazole Counseling:  Patient counseled regarding adverse effects of fluconazole including but not limited to headache, diarrhea, nausea, upset stomach, liver function test abnormalities, taste disturbance, and stomach pain.  There is a rare possibility of liver failure that can occur when taking fluconazole.  The patient understands that monitoring of LFTs and kidney function test may be required, especially at baseline. The patient verbalized understanding of the proper use and possible adverse effects of fluconazole.  All of the patient's questions and concerns were addressed.
Rhofade Counseling: Rhofade is a topical medication which can decrease superficial blood flow where applied. Side effects are uncommon and include stinging, redness and allergic reactions.

## 2024-03-07 RX ORDER — CETIRIZINE HYDROCHLORIDE 10 MG/1
10 TABLET ORAL DAILY PRN
COMMUNITY

## 2024-03-07 RX ORDER — ROSUVASTATIN CALCIUM 20 MG/1
20 TABLET, COATED ORAL DAILY
COMMUNITY

## 2024-03-07 RX ORDER — OXYCODONE HYDROCHLORIDE 5 MG/1
5 TABLET ORAL EVERY 6 HOURS PRN
Status: ON HOLD | COMMUNITY
End: 2024-03-09

## 2024-03-07 RX ORDER — POLYETHYLENE GLYCOL 3350 17 G/17G
1 POWDER, FOR SOLUTION ORAL DAILY PRN
COMMUNITY

## 2024-03-07 RX ORDER — EZETIMIBE 10 MG/1
10 TABLET ORAL DAILY
COMMUNITY

## 2024-03-07 RX ORDER — AMOXICILLIN 250 MG
1 CAPSULE ORAL DAILY PRN
COMMUNITY

## 2024-03-07 RX ORDER — METOPROLOL SUCCINATE 25 MG/1
25 TABLET, EXTENDED RELEASE ORAL DAILY
COMMUNITY

## 2024-03-07 RX ORDER — TADALAFIL 20 MG/1
20 TABLET ORAL PRN
COMMUNITY

## 2024-03-07 RX ORDER — ACETAMINOPHEN 500 MG
500-1000 TABLET ORAL EVERY 6 HOURS PRN
COMMUNITY

## 2024-03-07 RX ORDER — METHOCARBAMOL 500 MG/1
500 TABLET, FILM COATED ORAL EVERY 6 HOURS PRN
Status: ON HOLD | COMMUNITY
End: 2024-03-09

## 2024-03-07 RX ORDER — LISINOPRIL 20 MG/1
20 TABLET ORAL DAILY
COMMUNITY

## 2024-03-07 NOTE — PROGRESS NOTES
PTA medications updated by Medication Scribe prior to surgery via phone call with patient (last doses completed by Nurse)     Medication history sources: Patient, Surescripts, and H&P  In the past week, patient estimated taking medication this percent of the time: Greater than 90%      Significant changes made to the medication list:  Patient reports no longer taking the following meds (med scribe removed from PTA med list): Metoprolol Tartrate, fish oil, Taurine      Additional medication history information:   None    Medication reconciliation completed by provider prior to medication history? No    Time spent in this activity: 40 minutes    The information provided in this note is only as accurate as the sources available at the time of update(s)      Prior to Admission medications    Medication Sig Last Dose Taking? Auth Provider Long Term End Date   acetaminophen (TYLENOL) 500 MG tablet Take 500-1,000 mg by mouth every 6 hours as needed for mild pain Unknown at prn Yes Reported, Patient     cetirizine (ZYRTEC) 10 MG tablet Take 10 mg by mouth daily as needed for allergies Unknown at prn Yes Reported, Patient     ezetimibe (ZETIA) 10 MG tablet Take 10 mg by mouth daily  at am Yes Reported, Patient Yes    lisinopril (ZESTRIL) 20 MG tablet Take 20 mg by mouth daily 3/7/2024 at am Yes Reported, Patient Yes    methocarbamol (ROBAXIN) 500 MG tablet Take 500 mg by mouth every 6 hours as needed for muscle spasms 3/7/2024 at prn Yes Reported, Patient     metoprolol succinate ER (TOPROL XL) 25 MG 24 hr tablet Take 25 mg by mouth daily  at am Yes Reported, Patient     oxyCODONE (ROXICODONE) 5 MG tablet Take 5 mg by mouth every 6 hours as needed for severe pain Unknown at prn Yes Reported, Patient     polyethylene glycol (MIRALAX) 17 GM/Dose powder Take 1 Capful by mouth daily as needed for constipation Unknown at prn Yes Reported, Patient     rosuvastatin (CRESTOR) 20 MG tablet Take 20 mg by mouth daily  at am Yes  Reported, Patient Yes    senna-docusate (SENOKOT-S/PERICOLACE) 8.6-50 MG tablet Take 1 tablet by mouth daily as needed for constipation  at am Yes Reported, Patient     tadalafil (ADCIRCA/CIALIS) 20 MG tablet Take 20 mg by mouth as needed More than a month at prn Yes Reported, Patient Yes        Medication history completed by: Mona Acosta LPN

## 2024-03-08 ENCOUNTER — HOSPITAL ENCOUNTER (INPATIENT)
Facility: CLINIC | Age: 59
LOS: 1 days | Discharge: HOME | End: 2024-03-09
Attending: ORTHOPAEDIC SURGERY | Admitting: ORTHOPAEDIC SURGERY
Payer: COMMERCIAL

## 2024-03-08 ENCOUNTER — APPOINTMENT (OUTPATIENT)
Dept: GENERAL RADIOLOGY | Facility: CLINIC | Age: 59
End: 2024-03-08
Attending: ORTHOPAEDIC SURGERY
Payer: COMMERCIAL

## 2024-03-08 DIAGNOSIS — Z98.1 S/P CERVICAL SPINAL FUSION: Primary | ICD-10-CM

## 2024-03-08 LAB
CREAT SERPL-MCNC: 1.04 MG/DL (ref 0.67–1.17)
EGFRCR SERPLBLD CKD-EPI 2021: 84 ML/MIN/1.73M2
GLUCOSE BLDC GLUCOMTR-MCNC: 157 MG/DL (ref 70–99)
GLUCOSE SERPL-MCNC: 137 MG/DL (ref 70–99)
POTASSIUM SERPL-SCNC: 4.5 MMOL/L (ref 3.4–5.3)

## 2024-03-08 PROCEDURE — 250N000009 HC RX 250: Performed by: NURSE ANESTHETIST, CERTIFIED REGISTERED

## 2024-03-08 PROCEDURE — 710N000009 HC RECOVERY PHASE 1, LEVEL 1, PER MIN: Performed by: ORTHOPAEDIC SURGERY

## 2024-03-08 PROCEDURE — L1499 SPINAL ORTHOSIS NOS: HCPCS

## 2024-03-08 PROCEDURE — 120N000001 HC R&B MED SURG/OB

## 2024-03-08 PROCEDURE — 250N000013 HC RX MED GY IP 250 OP 250 PS 637: Performed by: NURSE ANESTHETIST, CERTIFIED REGISTERED

## 2024-03-08 PROCEDURE — 250N000013 HC RX MED GY IP 250 OP 250 PS 637: Performed by: ORTHOPAEDIC SURGERY

## 2024-03-08 PROCEDURE — 22551 ARTHRD ANT NTRBDY CERVICAL: CPT | Performed by: NURSE ANESTHETIST, CERTIFIED REGISTERED

## 2024-03-08 PROCEDURE — 82565 ASSAY OF CREATININE: CPT | Performed by: ANESTHESIOLOGY

## 2024-03-08 PROCEDURE — 250N000011 HC RX IP 250 OP 636: Performed by: NURSE ANESTHETIST, CERTIFIED REGISTERED

## 2024-03-08 PROCEDURE — 999N000141 HC STATISTIC PRE-PROCEDURE NURSING ASSESSMENT: Performed by: ORTHOPAEDIC SURGERY

## 2024-03-08 PROCEDURE — 82947 ASSAY GLUCOSE BLOOD QUANT: CPT | Performed by: ANESTHESIOLOGY

## 2024-03-08 PROCEDURE — 999N000179 XR SURGERY CARM FLUORO LESS THAN 5 MIN W STILLS

## 2024-03-08 PROCEDURE — 250N000011 HC RX IP 250 OP 636: Performed by: ORTHOPAEDIC SURGERY

## 2024-03-08 PROCEDURE — 360N000085 HC SURGERY LEVEL 5 W/ FLUORO, PER MIN: Performed by: ORTHOPAEDIC SURGERY

## 2024-03-08 PROCEDURE — 84132 ASSAY OF SERUM POTASSIUM: CPT | Performed by: ANESTHESIOLOGY

## 2024-03-08 PROCEDURE — L0172 CERV COL SR FOAM 2PC PRE OTS: HCPCS

## 2024-03-08 PROCEDURE — 250N000009 HC RX 250: Performed by: ORTHOPAEDIC SURGERY

## 2024-03-08 PROCEDURE — 01N10ZZ RELEASE CERVICAL NERVE, OPEN APPROACH: ICD-10-PCS | Performed by: ORTHOPAEDIC SURGERY

## 2024-03-08 PROCEDURE — 250N000011 HC RX IP 250 OP 636: Performed by: ANESTHESIOLOGY

## 2024-03-08 PROCEDURE — 258N000003 HC RX IP 258 OP 636: Performed by: STUDENT IN AN ORGANIZED HEALTH CARE EDUCATION/TRAINING PROGRAM

## 2024-03-08 PROCEDURE — 370N000017 HC ANESTHESIA TECHNICAL FEE, PER MIN: Performed by: ORTHOPAEDIC SURGERY

## 2024-03-08 PROCEDURE — 250N000013 HC RX MED GY IP 250 OP 250 PS 637: Performed by: STUDENT IN AN ORGANIZED HEALTH CARE EDUCATION/TRAINING PROGRAM

## 2024-03-08 PROCEDURE — 272N000001 HC OR GENERAL SUPPLY STERILE: Performed by: ORTHOPAEDIC SURGERY

## 2024-03-08 PROCEDURE — 250N000025 HC SEVOFLURANE, PER MIN: Performed by: ORTHOPAEDIC SURGERY

## 2024-03-08 PROCEDURE — 22551 ARTHRD ANT NTRBDY CERVICAL: CPT | Performed by: ANESTHESIOLOGY

## 2024-03-08 PROCEDURE — 258N000003 HC RX IP 258 OP 636: Performed by: NURSE ANESTHETIST, CERTIFIED REGISTERED

## 2024-03-08 PROCEDURE — 0RG20A0 FUSION OF 2 OR MORE CERVICAL VERTEBRAL JOINTS WITH INTERBODY FUSION DEVICE, ANTERIOR APPROACH, ANTERIOR COLUMN, OPEN APPROACH: ICD-10-PCS | Performed by: ORTHOPAEDIC SURGERY

## 2024-03-08 PROCEDURE — C1713 ANCHOR/SCREW BN/BN,TIS/BN: HCPCS | Performed by: ORTHOPAEDIC SURGERY

## 2024-03-08 PROCEDURE — 36415 COLL VENOUS BLD VENIPUNCTURE: CPT | Performed by: ANESTHESIOLOGY

## 2024-03-08 PROCEDURE — 250N000011 HC RX IP 250 OP 636: Performed by: STUDENT IN AN ORGANIZED HEALTH CARE EDUCATION/TRAINING PROGRAM

## 2024-03-08 PROCEDURE — 258N000003 HC RX IP 258 OP 636: Performed by: ANESTHESIOLOGY

## 2024-03-08 PROCEDURE — 0RB30ZZ EXCISION OF CERVICAL VERTEBRAL DISC, OPEN APPROACH: ICD-10-PCS | Performed by: ORTHOPAEDIC SURGERY

## 2024-03-08 DEVICE — MAGNETOS EASYPACK PUTTY 2.5CC 1-2MM USA
Type: IMPLANTABLE DEVICE | Site: SPINE CERVICAL | Status: FUNCTIONAL
Brand: MAGNETOS

## 2024-03-08 DEVICE — COHERE CERVICAL, 8X16X14MM 7°
Type: IMPLANTABLE DEVICE | Site: SPINE CERVICAL | Status: FUNCTIONAL
Brand: COHERE

## 2024-03-08 RX ORDER — PROPOFOL 10 MG/ML
INJECTION, EMULSION INTRAVENOUS CONTINUOUS PRN
Status: DISCONTINUED | OUTPATIENT
Start: 2024-03-08 | End: 2024-03-08

## 2024-03-08 RX ORDER — DEXAMETHASONE SODIUM PHOSPHATE 10 MG/ML
10 INJECTION, SOLUTION INTRAMUSCULAR; INTRAVENOUS ONCE
Status: DISCONTINUED | OUTPATIENT
Start: 2024-03-08 | End: 2024-03-08 | Stop reason: HOSPADM

## 2024-03-08 RX ORDER — HYDROMORPHONE HCL IN WATER/PF 6 MG/30 ML
0.2 PATIENT CONTROLLED ANALGESIA SYRINGE INTRAVENOUS EVERY 5 MIN PRN
Status: DISCONTINUED | OUTPATIENT
Start: 2024-03-08 | End: 2024-03-08 | Stop reason: HOSPADM

## 2024-03-08 RX ORDER — SODIUM CHLORIDE, SODIUM LACTATE, POTASSIUM CHLORIDE, CALCIUM CHLORIDE 600; 310; 30; 20 MG/100ML; MG/100ML; MG/100ML; MG/100ML
INJECTION, SOLUTION INTRAVENOUS CONTINUOUS PRN
Status: DISCONTINUED | OUTPATIENT
Start: 2024-03-08 | End: 2024-03-08

## 2024-03-08 RX ORDER — HYDROMORPHONE HYDROCHLORIDE 1 MG/ML
INJECTION, SOLUTION INTRAMUSCULAR; INTRAVENOUS; SUBCUTANEOUS PRN
Status: DISCONTINUED | OUTPATIENT
Start: 2024-03-08 | End: 2024-03-08

## 2024-03-08 RX ORDER — NALOXONE HYDROCHLORIDE 0.4 MG/ML
0.1 INJECTION, SOLUTION INTRAMUSCULAR; INTRAVENOUS; SUBCUTANEOUS
Status: DISCONTINUED | OUTPATIENT
Start: 2024-03-08 | End: 2024-03-08 | Stop reason: HOSPADM

## 2024-03-08 RX ORDER — CEFAZOLIN SODIUM/WATER 3 G/30 ML
3 SYRINGE (ML) INTRAVENOUS
Status: COMPLETED | OUTPATIENT
Start: 2024-03-08 | End: 2024-03-08

## 2024-03-08 RX ORDER — CEFAZOLIN SODIUM/WATER 2 G/20 ML
2 SYRINGE (ML) INTRAVENOUS EVERY 8 HOURS
Qty: 40 ML | Refills: 0 | Status: DISCONTINUED | OUTPATIENT
Start: 2024-03-08 | End: 2024-03-08

## 2024-03-08 RX ORDER — ASPIRIN 81 MG/1
1 TABLET ORAL DAILY
Status: ON HOLD | COMMUNITY
Start: 2022-04-12 | End: 2024-03-09

## 2024-03-08 RX ORDER — LIDOCAINE HYDROCHLORIDE 20 MG/ML
INJECTION, SOLUTION INFILTRATION; PERINEURAL PRN
Status: DISCONTINUED | OUTPATIENT
Start: 2024-03-08 | End: 2024-03-08

## 2024-03-08 RX ORDER — HYDROMORPHONE HCL IN WATER/PF 6 MG/30 ML
0.4 PATIENT CONTROLLED ANALGESIA SYRINGE INTRAVENOUS EVERY 5 MIN PRN
Status: DISCONTINUED | OUTPATIENT
Start: 2024-03-08 | End: 2024-03-08 | Stop reason: HOSPADM

## 2024-03-08 RX ORDER — DEXAMETHASONE SODIUM PHOSPHATE 4 MG/ML
4 INJECTION, SOLUTION INTRA-ARTICULAR; INTRALESIONAL; INTRAMUSCULAR; INTRAVENOUS; SOFT TISSUE EVERY 6 HOURS
Status: COMPLETED | OUTPATIENT
Start: 2024-03-08 | End: 2024-03-09

## 2024-03-08 RX ORDER — ONDANSETRON 2 MG/ML
INJECTION INTRAMUSCULAR; INTRAVENOUS PRN
Status: DISCONTINUED | OUTPATIENT
Start: 2024-03-08 | End: 2024-03-08

## 2024-03-08 RX ORDER — ACETAMINOPHEN 325 MG/1
650 TABLET ORAL EVERY 4 HOURS PRN
Status: DISCONTINUED | OUTPATIENT
Start: 2024-03-11 | End: 2024-03-09 | Stop reason: HOSPADM

## 2024-03-08 RX ORDER — OXYCODONE HYDROCHLORIDE 5 MG/1
10 TABLET ORAL EVERY 4 HOURS PRN
Status: DISCONTINUED | OUTPATIENT
Start: 2024-03-08 | End: 2024-03-09 | Stop reason: HOSPADM

## 2024-03-08 RX ORDER — LABETALOL HYDROCHLORIDE 5 MG/ML
5 INJECTION, SOLUTION INTRAVENOUS
Status: COMPLETED | OUTPATIENT
Start: 2024-03-08 | End: 2024-03-08

## 2024-03-08 RX ORDER — NALOXONE HYDROCHLORIDE 0.4 MG/ML
0.2 INJECTION, SOLUTION INTRAMUSCULAR; INTRAVENOUS; SUBCUTANEOUS
Status: DISCONTINUED | OUTPATIENT
Start: 2024-03-08 | End: 2024-03-09 | Stop reason: HOSPADM

## 2024-03-08 RX ORDER — ALBUTEROL SULFATE 90 UG/1
AEROSOL, METERED RESPIRATORY (INHALATION) PRN
Status: DISCONTINUED | OUTPATIENT
Start: 2024-03-08 | End: 2024-03-08

## 2024-03-08 RX ORDER — ONDANSETRON 4 MG/1
4 TABLET, ORALLY DISINTEGRATING ORAL EVERY 6 HOURS PRN
Status: DISCONTINUED | OUTPATIENT
Start: 2024-03-08 | End: 2024-03-09 | Stop reason: HOSPADM

## 2024-03-08 RX ORDER — BISACODYL 10 MG
10 SUPPOSITORY, RECTAL RECTAL DAILY PRN
Status: DISCONTINUED | OUTPATIENT
Start: 2024-03-08 | End: 2024-03-09 | Stop reason: HOSPADM

## 2024-03-08 RX ORDER — HYDROMORPHONE HCL IN WATER/PF 6 MG/30 ML
0.2 PATIENT CONTROLLED ANALGESIA SYRINGE INTRAVENOUS
Status: DISCONTINUED | OUTPATIENT
Start: 2024-03-08 | End: 2024-03-09 | Stop reason: HOSPADM

## 2024-03-08 RX ORDER — FENTANYL CITRATE 50 UG/ML
INJECTION, SOLUTION INTRAMUSCULAR; INTRAVENOUS PRN
Status: DISCONTINUED | OUTPATIENT
Start: 2024-03-08 | End: 2024-03-08

## 2024-03-08 RX ORDER — SODIUM CHLORIDE, SODIUM LACTATE, POTASSIUM CHLORIDE, CALCIUM CHLORIDE 600; 310; 30; 20 MG/100ML; MG/100ML; MG/100ML; MG/100ML
INJECTION, SOLUTION INTRAVENOUS CONTINUOUS
Status: DISCONTINUED | OUTPATIENT
Start: 2024-03-08 | End: 2024-03-08 | Stop reason: HOSPADM

## 2024-03-08 RX ORDER — LABETALOL HYDROCHLORIDE 5 MG/ML
5 INJECTION, SOLUTION INTRAVENOUS ONCE
Status: COMPLETED | OUTPATIENT
Start: 2024-03-08 | End: 2024-03-08

## 2024-03-08 RX ORDER — HYDROXYZINE HYDROCHLORIDE 25 MG/1
25 TABLET, FILM COATED ORAL EVERY 6 HOURS PRN
Status: DISCONTINUED | OUTPATIENT
Start: 2024-03-08 | End: 2024-03-09 | Stop reason: HOSPADM

## 2024-03-08 RX ORDER — AMOXICILLIN 250 MG
2 CAPSULE ORAL 2 TIMES DAILY
Status: DISCONTINUED | OUTPATIENT
Start: 2024-03-08 | End: 2024-03-09 | Stop reason: HOSPADM

## 2024-03-08 RX ORDER — LIDOCAINE 40 MG/G
CREAM TOPICAL
Status: DISCONTINUED | OUTPATIENT
Start: 2024-03-08 | End: 2024-03-08 | Stop reason: HOSPADM

## 2024-03-08 RX ORDER — POLYETHYLENE GLYCOL 3350 17 G/17G
17 POWDER, FOR SOLUTION ORAL DAILY
Status: DISCONTINUED | OUTPATIENT
Start: 2024-03-09 | End: 2024-03-09 | Stop reason: HOSPADM

## 2024-03-08 RX ORDER — LISINOPRIL 20 MG/1
20 TABLET ORAL DAILY
Status: DISCONTINUED | OUTPATIENT
Start: 2024-03-08 | End: 2024-03-09 | Stop reason: HOSPADM

## 2024-03-08 RX ORDER — NALOXONE HYDROCHLORIDE 0.4 MG/ML
0.4 INJECTION, SOLUTION INTRAMUSCULAR; INTRAVENOUS; SUBCUTANEOUS
Status: DISCONTINUED | OUTPATIENT
Start: 2024-03-08 | End: 2024-03-09 | Stop reason: HOSPADM

## 2024-03-08 RX ORDER — ACETAMINOPHEN 325 MG/1
975 TABLET ORAL EVERY 8 HOURS
Status: DISCONTINUED | OUTPATIENT
Start: 2024-03-08 | End: 2024-03-09 | Stop reason: HOSPADM

## 2024-03-08 RX ORDER — EZETIMIBE 10 MG/1
10 TABLET ORAL DAILY
Status: DISCONTINUED | OUTPATIENT
Start: 2024-03-09 | End: 2024-03-09 | Stop reason: HOSPADM

## 2024-03-08 RX ORDER — FENTANYL CITRATE 0.05 MG/ML
25 INJECTION, SOLUTION INTRAMUSCULAR; INTRAVENOUS EVERY 5 MIN PRN
Status: DISCONTINUED | OUTPATIENT
Start: 2024-03-08 | End: 2024-03-08 | Stop reason: HOSPADM

## 2024-03-08 RX ORDER — NITROGLYCERIN 0.4 MG/1
0.4 TABLET SUBLINGUAL
COMMUNITY
Start: 2022-04-04

## 2024-03-08 RX ORDER — ONDANSETRON 2 MG/ML
4 INJECTION INTRAMUSCULAR; INTRAVENOUS EVERY 6 HOURS PRN
Status: DISCONTINUED | OUTPATIENT
Start: 2024-03-08 | End: 2024-03-09 | Stop reason: HOSPADM

## 2024-03-08 RX ORDER — FENTANYL CITRATE 0.05 MG/ML
50 INJECTION, SOLUTION INTRAMUSCULAR; INTRAVENOUS EVERY 5 MIN PRN
Status: DISCONTINUED | OUTPATIENT
Start: 2024-03-08 | End: 2024-03-08 | Stop reason: HOSPADM

## 2024-03-08 RX ORDER — CEFAZOLIN SODIUM/WATER 3 G/30 ML
3 SYRINGE (ML) INTRAVENOUS SEE ADMIN INSTRUCTIONS
Status: DISCONTINUED | OUTPATIENT
Start: 2024-03-08 | End: 2024-03-08 | Stop reason: HOSPADM

## 2024-03-08 RX ORDER — OXYCODONE HYDROCHLORIDE 5 MG/1
5 TABLET ORAL EVERY 4 HOURS PRN
Status: DISCONTINUED | OUTPATIENT
Start: 2024-03-08 | End: 2024-03-09 | Stop reason: HOSPADM

## 2024-03-08 RX ORDER — ONDANSETRON 2 MG/ML
4 INJECTION INTRAMUSCULAR; INTRAVENOUS EVERY 30 MIN PRN
Status: DISCONTINUED | OUTPATIENT
Start: 2024-03-08 | End: 2024-03-08 | Stop reason: HOSPADM

## 2024-03-08 RX ORDER — SODIUM CHLORIDE 9 MG/ML
INJECTION, SOLUTION INTRAVENOUS CONTINUOUS
Status: DISCONTINUED | OUTPATIENT
Start: 2024-03-08 | End: 2024-03-09 | Stop reason: HOSPADM

## 2024-03-08 RX ORDER — ROSUVASTATIN CALCIUM 20 MG/1
20 TABLET, COATED ORAL DAILY
Status: DISCONTINUED | OUTPATIENT
Start: 2024-03-09 | End: 2024-03-09 | Stop reason: HOSPADM

## 2024-03-08 RX ORDER — DEXAMETHASONE SODIUM PHOSPHATE 4 MG/ML
INJECTION, SOLUTION INTRA-ARTICULAR; INTRALESIONAL; INTRAMUSCULAR; INTRAVENOUS; SOFT TISSUE PRN
Status: DISCONTINUED | OUTPATIENT
Start: 2024-03-08 | End: 2024-03-08

## 2024-03-08 RX ORDER — ONDANSETRON 4 MG/1
4 TABLET, ORALLY DISINTEGRATING ORAL EVERY 30 MIN PRN
Status: DISCONTINUED | OUTPATIENT
Start: 2024-03-08 | End: 2024-03-08 | Stop reason: HOSPADM

## 2024-03-08 RX ORDER — METOPROLOL SUCCINATE 25 MG/1
25 TABLET, EXTENDED RELEASE ORAL DAILY
Status: DISCONTINUED | OUTPATIENT
Start: 2024-03-09 | End: 2024-03-09 | Stop reason: HOSPADM

## 2024-03-08 RX ORDER — CALCIUM CARBONATE 500 MG/1
500 TABLET, CHEWABLE ORAL 4 TIMES DAILY PRN
Status: DISCONTINUED | OUTPATIENT
Start: 2024-03-08 | End: 2024-03-09 | Stop reason: HOSPADM

## 2024-03-08 RX ORDER — GABAPENTIN 300 MG/1
300 CAPSULE ORAL
Status: COMPLETED | OUTPATIENT
Start: 2024-03-08 | End: 2024-03-08

## 2024-03-08 RX ORDER — LIDOCAINE 40 MG/G
CREAM TOPICAL
Status: DISCONTINUED | OUTPATIENT
Start: 2024-03-08 | End: 2024-03-09 | Stop reason: HOSPADM

## 2024-03-08 RX ORDER — METHOCARBAMOL 750 MG/1
750 TABLET, FILM COATED ORAL EVERY 6 HOURS PRN
Status: DISCONTINUED | OUTPATIENT
Start: 2024-03-08 | End: 2024-03-09

## 2024-03-08 RX ORDER — PROPOFOL 10 MG/ML
INJECTION, EMULSION INTRAVENOUS PRN
Status: DISCONTINUED | OUTPATIENT
Start: 2024-03-08 | End: 2024-03-08

## 2024-03-08 RX ORDER — PROCHLORPERAZINE MALEATE 10 MG
10 TABLET ORAL EVERY 6 HOURS PRN
Status: DISCONTINUED | OUTPATIENT
Start: 2024-03-08 | End: 2024-03-09 | Stop reason: HOSPADM

## 2024-03-08 RX ORDER — MAGNESIUM HYDROXIDE 1200 MG/15ML
LIQUID ORAL PRN
Status: DISCONTINUED | OUTPATIENT
Start: 2024-03-08 | End: 2024-03-08 | Stop reason: HOSPADM

## 2024-03-08 RX ORDER — CEFAZOLIN SODIUM 2 G/100ML
2 INJECTION, SOLUTION INTRAVENOUS EVERY 8 HOURS
Status: DISCONTINUED | OUTPATIENT
Start: 2024-03-08 | End: 2024-03-08

## 2024-03-08 RX ORDER — CETIRIZINE HYDROCHLORIDE 10 MG/1
10 TABLET ORAL DAILY PRN
Status: DISCONTINUED | OUTPATIENT
Start: 2024-03-08 | End: 2024-03-09 | Stop reason: HOSPADM

## 2024-03-08 RX ORDER — CEFAZOLIN SODIUM 2 G/100ML
2 INJECTION, SOLUTION INTRAVENOUS EVERY 8 HOURS
Status: COMPLETED | OUTPATIENT
Start: 2024-03-08 | End: 2024-03-09

## 2024-03-08 RX ORDER — HYDROMORPHONE HCL IN WATER/PF 6 MG/30 ML
0.4 PATIENT CONTROLLED ANALGESIA SYRINGE INTRAVENOUS
Status: DISCONTINUED | OUTPATIENT
Start: 2024-03-08 | End: 2024-03-09 | Stop reason: HOSPADM

## 2024-03-08 RX ADMIN — HYDROMORPHONE HYDROCHLORIDE 0.4 MG: 0.2 INJECTION, SOLUTION INTRAMUSCULAR; INTRAVENOUS; SUBCUTANEOUS at 12:28

## 2024-03-08 RX ADMIN — PHENYLEPHRINE HYDROCHLORIDE 0.4 MCG/KG/MIN: 10 INJECTION INTRAVENOUS at 08:04

## 2024-03-08 RX ADMIN — HYDROMORPHONE HYDROCHLORIDE 0.4 MG: 0.2 INJECTION, SOLUTION INTRAMUSCULAR; INTRAVENOUS; SUBCUTANEOUS at 17:21

## 2024-03-08 RX ADMIN — MIDAZOLAM 2 MG: 1 INJECTION INTRAMUSCULAR; INTRAVENOUS at 07:29

## 2024-03-08 RX ADMIN — DEXAMETHASONE SODIUM PHOSPHATE 4 MG: 4 INJECTION, SOLUTION INTRA-ARTICULAR; INTRALESIONAL; INTRAMUSCULAR; INTRAVENOUS; SOFT TISSUE at 08:20

## 2024-03-08 RX ADMIN — HYDROMORPHONE HYDROCHLORIDE 0.4 MG: 0.2 INJECTION, SOLUTION INTRAMUSCULAR; INTRAVENOUS; SUBCUTANEOUS at 13:20

## 2024-03-08 RX ADMIN — HYDROMORPHONE HYDROCHLORIDE 0.4 MG: 0.2 INJECTION, SOLUTION INTRAMUSCULAR; INTRAVENOUS; SUBCUTANEOUS at 21:51

## 2024-03-08 RX ADMIN — PROPOFOL 25 MCG/KG/MIN: 10 INJECTION, EMULSION INTRAVENOUS at 07:45

## 2024-03-08 RX ADMIN — LISINOPRIL 20 MG: 20 TABLET ORAL at 19:49

## 2024-03-08 RX ADMIN — ALBUTEROL SULFATE 6 PUFF: 90 AEROSOL, METERED RESPIRATORY (INHALATION) at 07:46

## 2024-03-08 RX ADMIN — PROPOFOL 100 MG: 10 INJECTION, EMULSION INTRAVENOUS at 07:46

## 2024-03-08 RX ADMIN — ROCURONIUM BROMIDE 20 MG: 50 INJECTION, SOLUTION INTRAVENOUS at 10:05

## 2024-03-08 RX ADMIN — HYDROMORPHONE HYDROCHLORIDE 0.4 MG: 0.2 INJECTION, SOLUTION INTRAMUSCULAR; INTRAVENOUS; SUBCUTANEOUS at 19:24

## 2024-03-08 RX ADMIN — HYDROMORPHONE HYDROCHLORIDE 0.5 MG: 1 INJECTION, SOLUTION INTRAMUSCULAR; INTRAVENOUS; SUBCUTANEOUS at 10:07

## 2024-03-08 RX ADMIN — ACETAMINOPHEN 975 MG: 325 TABLET, FILM COATED ORAL at 19:49

## 2024-03-08 RX ADMIN — PHENYLEPHRINE HYDROCHLORIDE 100 MCG: 10 INJECTION INTRAVENOUS at 09:21

## 2024-03-08 RX ADMIN — DEXAMETHASONE SODIUM PHOSPHATE 4 MG: 4 INJECTION, SOLUTION INTRAMUSCULAR; INTRAVENOUS at 15:33

## 2024-03-08 RX ADMIN — Medication 3 G: at 07:45

## 2024-03-08 RX ADMIN — GABAPENTIN 300 MG: 300 CAPSULE ORAL at 06:31

## 2024-03-08 RX ADMIN — PROPOFOL 200 MG: 10 INJECTION, EMULSION INTRAVENOUS at 07:38

## 2024-03-08 RX ADMIN — SUGAMMADEX 200 MG: 100 INJECTION, SOLUTION INTRAVENOUS at 11:24

## 2024-03-08 RX ADMIN — HYDROMORPHONE HYDROCHLORIDE 0.5 MG: 1 INJECTION, SOLUTION INTRAMUSCULAR; INTRAVENOUS; SUBCUTANEOUS at 11:20

## 2024-03-08 RX ADMIN — SODIUM CHLORIDE, POTASSIUM CHLORIDE, SODIUM LACTATE AND CALCIUM CHLORIDE: 600; 310; 30; 20 INJECTION, SOLUTION INTRAVENOUS at 09:27

## 2024-03-08 RX ADMIN — HYDROMORPHONE HYDROCHLORIDE 0.4 MG: 0.2 INJECTION, SOLUTION INTRAMUSCULAR; INTRAVENOUS; SUBCUTANEOUS at 14:15

## 2024-03-08 RX ADMIN — FENTANYL CITRATE 100 MCG: 50 INJECTION INTRAMUSCULAR; INTRAVENOUS at 07:38

## 2024-03-08 RX ADMIN — LIDOCAINE HYDROCHLORIDE 100 MG: 20 INJECTION, SOLUTION INFILTRATION; PERINEURAL at 07:38

## 2024-03-08 RX ADMIN — LABETALOL HYDROCHLORIDE 5 MG: 5 INJECTION INTRAVENOUS at 14:01

## 2024-03-08 RX ADMIN — SUCCINYLCHOLINE CHLORIDE 200 MG: 20 INJECTION, SOLUTION INTRAMUSCULAR; INTRAVENOUS; PARENTERAL at 07:38

## 2024-03-08 RX ADMIN — CEFAZOLIN SODIUM 2 G: 2 INJECTION, SOLUTION INTRAVENOUS at 19:53

## 2024-03-08 RX ADMIN — OXYCODONE HYDROCHLORIDE 10 MG: 5 TABLET ORAL at 20:03

## 2024-03-08 RX ADMIN — SODIUM CHLORIDE, POTASSIUM CHLORIDE, SODIUM LACTATE AND CALCIUM CHLORIDE: 600; 310; 30; 20 INJECTION, SOLUTION INTRAVENOUS at 07:38

## 2024-03-08 RX ADMIN — HYDROMORPHONE HYDROCHLORIDE 0.4 MG: 0.2 INJECTION, SOLUTION INTRAMUSCULAR; INTRAVENOUS; SUBCUTANEOUS at 13:36

## 2024-03-08 RX ADMIN — SODIUM CHLORIDE, POTASSIUM CHLORIDE, SODIUM LACTATE AND CALCIUM CHLORIDE: 600; 310; 30; 20 INJECTION, SOLUTION INTRAVENOUS at 06:31

## 2024-03-08 RX ADMIN — ROCURONIUM BROMIDE 50 MG: 50 INJECTION, SOLUTION INTRAVENOUS at 07:46

## 2024-03-08 RX ADMIN — ONDANSETRON 4 MG: 2 INJECTION INTRAMUSCULAR; INTRAVENOUS at 11:00

## 2024-03-08 RX ADMIN — SODIUM CHLORIDE: 9 INJECTION, SOLUTION INTRAVENOUS at 15:17

## 2024-03-08 RX ADMIN — FENTANYL CITRATE 50 MCG: 50 INJECTION, SOLUTION INTRAMUSCULAR; INTRAVENOUS at 12:05

## 2024-03-08 RX ADMIN — DOCUSATE SODIUM 50 MG AND SENNOSIDES 8.6 MG 2 TABLET: 8.6; 5 TABLET, FILM COATED ORAL at 19:49

## 2024-03-08 RX ADMIN — ROCURONIUM BROMIDE 50 MG: 50 INJECTION, SOLUTION INTRAVENOUS at 08:18

## 2024-03-08 RX ADMIN — DEXAMETHASONE SODIUM PHOSPHATE 4 MG: 4 INJECTION, SOLUTION INTRAMUSCULAR; INTRAVENOUS at 21:31

## 2024-03-08 RX ADMIN — METHOCARBAMOL 750 MG: 750 TABLET ORAL at 21:32

## 2024-03-08 RX ADMIN — METHOCARBAMOL 750 MG: 750 TABLET ORAL at 15:33

## 2024-03-08 RX ADMIN — OXYCODONE HYDROCHLORIDE 10 MG: 5 TABLET ORAL at 15:33

## 2024-03-08 RX ADMIN — HYDROMORPHONE HYDROCHLORIDE 0.5 MG: 1 INJECTION, SOLUTION INTRAMUSCULAR; INTRAVENOUS; SUBCUTANEOUS at 08:27

## 2024-03-08 RX ADMIN — FENTANYL CITRATE 50 MCG: 50 INJECTION, SOLUTION INTRAMUSCULAR; INTRAVENOUS at 11:56

## 2024-03-08 ASSESSMENT — ACTIVITIES OF DAILY LIVING (ADL)
ADLS_ACUITY_SCORE: 18
ADLS_ACUITY_SCORE: 18
ADLS_ACUITY_SCORE: 24
ADLS_ACUITY_SCORE: 18
ADLS_ACUITY_SCORE: 35
ADLS_ACUITY_SCORE: 24
ADLS_ACUITY_SCORE: 18

## 2024-03-08 NOTE — ANESTHESIA CARE TRANSFER NOTE
Patient: Dino Best    Procedure: Procedure(s):  CERVICAL 4 TO CERVICAL 7 ANTERIOR CERVICAL DISCECTOMY AND CERVICAL 4 TO CERVICAL 7 FUSION       Diagnosis: Cervical spinal stenosis [M48.02]  Cervical myelopathy (H) [G95.9]  Diagnosis Additional Information: No value filed.    Anesthesia Type:   General     Note:      Level of Consciousness: awake  Oxygen Supplementation: face mask  Level of Supplemental Oxygen (L/min / FiO2): 10  Independent Airway: airway patency satisfactory and stable  Dentition: dentition unchanged  Vital Signs Stable: post-procedure vital signs reviewed and stable  Report to RN Given: handoff report given  Patient transferred to: PACU    Handoff Report: Identifed the Patient, Identified the Reponsible Provider, Reviewed the pertinent medical history, Discussed the surgical course, Reviewed Intra-OP anesthesia mangement and issues during anesthesia, Set expectations for post-procedure period and Allowed opportunity for questions and acknowledgement of understanding      Vitals:  Vitals Value Taken Time   BP     Temp     Pulse 75 03/08/24 1142   Resp 10 03/08/24 1142   SpO2 97 % 03/08/24 1142   Vitals shown include unfiled device data.    Electronically Signed By: CLARA Bergman CRNA  March 8, 2024  11:43 AM

## 2024-03-08 NOTE — PROGRESS NOTES
Pt is Aox4, not oob this yet due to muscle tightness and pain however does adjust self in bed independently. Pain somewhat controlled with PRN oxycodone, roboxin and IV dilaudid given x1. BP elevated not abnormal for patient otherwise VSS on 3L NC. DELANO drain in place with good output. Discharge pending.

## 2024-03-08 NOTE — OP NOTE
Orthopedic Surgery Operative Report    Patient:   Dino Best  MRN:   [id number]   :  1965  Facility: [facility]   Date:  24         PREOPERATIVE DIAGNOSIS:    Left C5, C6, C7 radiculopathies with 2/5 deltoid strength  Central stenosis C5-6    POSTOPERATIVE DIAGNOSIS:    Left C5, C6, C7 radiculopathies with 2/5 deltoid strength  Central stenosis C5-6    PROCEDURE PERFORMED:    Anterior cervical discectomy and fusion C4-7  Application of anterior spinal plate C4-7  Placement of interbody cages C4-5, C5-6, C6-7  Use of nonstructural allograft combined with local autograft    SURGEON:    Mj Jara MD    SURGICAL ASSISTANT:    Kelle Carlson PA-C     ANESTHESIA:  General    FINDINGS:    Severe spondylosis at all levels C4-7 with significant anterior osteophyte formation.  Severe central stenosis at C5-6.  Severe foraminal stenosis left C4-5, C5-6, C6-7.    COMPLICATIONS:     None apparent    SPECIMENS:    None    ESTIMATED BLOOD LOSS:  15 cc    IMPLANTS:    Nuvasive Cohere interbody cages:  16 mm x 14 mm, 7 degree, 8 mm height at C4-5 and C6-7, 7 mm height at C5-6  Nuvasive ACP 1.9H 60 mm anterior cervical plate with 3.5 mm screws  MagnetOS nonstructural graft     INDICATION FOR OPERATION:  The patient is a 58 year old male who developed left upper extreme radicular symptoms with severe left deltoid weakness related to the above diagnosis.  Conservative measures were not effective in controlling his symptoms.  I discussed with him the risks, benefits, and alternatives of the above operation and he wished to proceed.    DESCRIPTION OF PROCEDURE:    The patient was met in the preoperative holding area and the operative site was confirmed and marked.  We once again reviewed the risks, benefits, and alternatives of the operation and the patient wished to proceed with the surgery.    The patient was taken back to the operating room and induced under general anesthesia.  The  patient was positioned supine with all bony prominences well padded.  The surgical site was prepped and draped in the usual standard fashion.    I radiographically localized an appropriate site for the incision with a spinal needle laid on the skin.  I then incised the skin transversely in a skin fold on the right side of the neck medial to the sternocleidomastoid and performed a standard Bowie-Londono approach.  I undermined the platysma and then incised it transversely.  I then opened the fascia medial to the sternocleidomastoid and then bluntly dissected down to the anterior spine, taking great care to make sure that the esophagus and trachea were not damaged medially, and the carotid sheath remained lateral to me.  I did not encounter the recurrent laryngeal nerve in the field.  After retracting the esophagus medially, I cleared off the tissue in the midline of the spine, thereby exposing the vertebral bodies and disc space.  I placed a hemostat on the annulus of the exposed disc, and then radiographically confirmed the correct C5-6 level, with two independent OR personnel also noting they counted this as the correct level.  I then continued my exposure to the vertebral body above and below the operative discs C4-7.  I elevated the longus coli bilaterally and placed my Trimline retractor below them.    First at the C5-4 level, and subsequently at the C4-5 and finally the C6-7 level, I performed the following:  I placed Landenberg pins into the vertebral bodies adjacent to the disc and distracted across the disc space.  I performed an annulotomy.  I then removed the disc with a combination of curettes and Kerrisons.  I used a bur along the posterior osteophytes to get down to the posterior longitudinal ligament.  I used a rainer also on the endplates to contour them appropriately for my cage.   I took down the posterior longitudinal ligament and visualized dura at C4-5 and C5-6, I did not take down the posterior  longitudinal ligament at C6-7.  I performed a foraminotomy on both sides to ensure adequate space for the nerve roots.  After the foraminotomies I could easily pass a nerve hook out the foramina without resistance, confirming adequate decompression.  Prior to foraminotomies there had been severe foraminal stenosis on the left at all 3 levels, and severe central stenosis at C5-6.    I next trialed for an selected a [facility] Cohere porous PEEK interbody cage.  A 14 mm x 16 mm footprint 7 degree cage was most appropriate.  Cage heights of 8 mm were most appropriate at C4-5 and C6-7, cage height of 7 mm was most appropriate at C5-6.  This cage was selected and packed with MagnetOS nonstructural graft combined with local autograft.  The cage was placed and found to have excellent stability with friction fit against the endplates.     I contoured the anterior osteophytes on the vertebral bodies to ensure that my plate would be able to sit flush.  I then selected a 60 mm Nuvasive ACP 1.9H plate and placed it on the anterior aspect of the operative vertebrae.  I used fluoroscopy to confirm appropriate position of this, and then placed screws into the vertebral bodies.  I deployed the locking mechanisms on the plate.    I achieved final hemostasis and thoroughly irrigated the surgical site.  I then began closure.  A deep 10 [place] DELANO drain was placed.  I closed the platysma with a running 3-0 Vicryl.  Subcutaneous tissues were closed with 4-0 Vicryl loosely, and then a running 4-0 Monocryl.  A sterile dressing and Tegaderm were placed over the incision.      The patient was allowed to emerge from anesthesia which occurred without incident and was transported to PACU in stable condition.      A surgical assistant was critical for this case to assist in retraction of the soft tissues and evacuating blood from the surgical field to facilitate a safer operation with improved visualization and less time under anesthesia.     All  sponge and needle counts were correct at case conclusion.      POSTOPERATIVE PLAN:  -Activity:    Up with assist  -Weight Bearing Status:  WBAT   -Bracing:   Hard collar to be fit by orthotics team - soft collar until it arrives.  May briefly remove for eating and hygiene if desired, and may remove to sleep.  -Antibiotics:     Ancef x24h  -Anticoagulation:   SCDs  -Steroids:   Decadron 4 mg IV q6h x24h  -Pain control:    IV and PO, wean to PO as able  -Drain:     monitor and chart output  -Dressing:    Ok to shower with dressing in place, dressing ok to get wet.  -Diet:     ADAT    -Disposition:    Pending medical stability, PT, anticipate discharge around POD 1-2  -Follow up:     2 weeks in my clinic          Mj Jara MD

## 2024-03-08 NOTE — ANESTHESIA POSTPROCEDURE EVALUATION
Patient: Dino Best    Procedure: Procedure(s):  CERVICAL 4 TO CERVICAL 7 ANTERIOR CERVICAL DISCECTOMY AND CERVICAL 4 TO CERVICAL 7 FUSION       Anesthesia Type:  General    Note:  Disposition: Inpatient   Postop Pain Control: Uneventful            Sign Out: Well controlled pain   PONV: No   Neuro/Psych: Uneventful            Sign Out: Acceptable/Baseline neuro status   Airway/Respiratory: Uneventful            Sign Out: Acceptable/Baseline resp. status   CV/Hemodynamics: Uneventful            Sign Out: Acceptable CV status; No obvious hypovolemia; No obvious fluid overload   Other NRE: NONE   DID A NON-ROUTINE EVENT OCCUR?            Last vitals:  Vitals Value Taken Time   /104 03/08/24 1333   Temp     Pulse 61 03/08/24 1339   Resp 8 03/08/24 1339   SpO2 98 % 03/08/24 1339   Vitals shown include unfiled device data.    Electronically Signed By: Mehnaz Jordan  March 8, 2024  1:41 PM

## 2024-03-08 NOTE — ANESTHESIA PREPROCEDURE EVALUATION
Anesthesia Pre-Procedure Evaluation    Patient: Dino Best   MRN: [id number] : 1965        Procedure : Procedure(s):  CERVICAL 4 TO CERVICAL 7 ANTERIOR CERVICAL DISCECTOMY AND CERVICAL 4 TO CERVICAL 7 FUSION          Past Medical History:   Diagnosis Date    Arthritis of knee     CAD (coronary artery disease)     Diabetes (H)     HTN (hypertension)     Motion sickness     PONV (postoperative nausea and vomiting)     Sleep apnea     Uncomplicated asthma       Past Surgical History:   Procedure Laterality Date    COLONOSCOPY      ENT SURGERY      ORTHOPEDIC SURGERY Left     rotator cuff    STENT, CORONARY, CR  2004      Allergies   Allergen Reactions    Atorvastatin Other (See Comments)     Hepatic dysfunction    Simvastatin Other (See Comments)     Hepatic dysfunction      Social History     Tobacco Use    Smoking status: Former     Types: Cigarettes    Smokeless tobacco: Never   Substance Use Topics    Alcohol use: Yes     Comment: 8 drinks a week      Wt Readings from Last 1 Encounters:   24 136.1 kg (300 lb 1.6 oz)        Anesthesia Evaluation   Pt has had prior anesthetic.     History of anesthetic complications  - PONV.      ROS/MED HX  ENT/Pulmonary:     (+) sleep apnea, uses CPAP,                    asthma                  Neurologic:       Cardiovascular:     (+)  hypertension- -  CAD -  - stent (pci x 3)-                                      METS/Exercise Tolerance:     Hematologic:       Musculoskeletal: Comment: Back pain/sciatica  C5-7 radiculopathy      GI/Hepatic:    (-) GERD   Renal/Genitourinary:       Endo:     (+)  type II DM,                    Psychiatric/Substance Use:       Infectious Disease:       Malignancy:       Other:            Physical Exam    Airway        Mallampati: II   TM distance: > 3 FB   Neck ROM: full   Mouth opening: > 3 cm    Respiratory Devices and Support         Dental       (+) Minor Abnormalities - some fillings, tiny chips      Cardiovascular    "cardiovascular exam normal          Pulmonary   pulmonary exam normal                OUTSIDE LABS:  CBC: No results found for: \"WBC\", \"HGB\", \"HCT\", \"PLT\"  BMP:   Lab Results   Component Value Date    POTASSIUM 4.5 03/08/2024    CR 1.04 03/08/2024     (H) 03/08/2024     COAGS: No results found for: \"PTT\", \"INR\", \"FIBR\"  POC: No results found for: \"BGM\", \"HCG\", \"HCGS\"  HEPATIC: No results found for: \"ALBUMIN\", \"PROTTOTAL\", \"ALT\", \"AST\", \"GGT\", \"ALKPHOS\", \"BILITOTAL\", \"BILIDIRECT\", \"KATI\"  OTHER:   Lab Results   Component Value Date    CRP 7.6 12/10/2010    SED 9 12/10/2010       Anesthesia Plan    ASA Status:  2       Anesthesia Type: General.     - Airway: ETT   Induction: Intravenous.   Maintenance: Balanced.   Techniques and Equipment:     - Airway: Video-Laryngoscope       Consents    Anesthesia Plan(s) and associated risks, benefits, and realistic alternatives discussed. Questions answered and patient/representative(s) expressed understanding.     - Discussed:     - Discussed with:  Patient            Postoperative Care    Pain management: IV analgesics, Oral pain medications.   PONV prophylaxis: Ondansetron (or other 5HT-3), Dexamethasone or Solumedrol, Background Propofol Infusion     Comments:    Other Comments: Previous easy intubation           Mehnaz Jordan    I have reviewed the pertinent notes and labs in the chart from the past 30 days and (re)examined the patient.  Any updates or changes from those notes are reflected in this note.             # Drug Induced Platelet Defect: home medication list includes an antiplatelet medication  # Severe Obesity: Estimated body mass index is 41.86 kg/m  as calculated from the following:    Height as of this encounter: 1.803 m (5' 11\").    Weight as of this encounter: 136.1 kg (300 lb 1.6 oz).      "

## 2024-03-08 NOTE — ANESTHESIA PROCEDURE NOTES
Airway       Patient location during procedure: OR       Procedure Start/Stop Times: 3/8/2024 7:40 AM  Staff -        CRNA: Montserrat Roca APRN CRNA       Performed By: CRNA  Consent for Airway        Urgency: elective  Indications and Patient Condition       Indications for airway management: renan-procedural       Induction type:intravenous       Mask difficulty assessment: 2 - vent by mask + OA or adjuvant +/- NMBA    Final Airway Details       Final airway type: endotracheal airway       Successful airway: ETT - single  Endotracheal Airway Details        ETT size (mm): 8.0       Cuffed: yes       Cuff volume (mL): 10       Successful intubation technique: video laryngoscopy       VL Blade Size: Glidescope 4       Grade View of Cords: 1       Adjucts: stylet       Position: Right       Measured from: lips       Secured at (cm): 24       Bite block used: None    Post intubation assessment        Placement verified by: capnometry, equal breath sounds and chest rise        Number of attempts at approach: 1       Number of other approaches attempted: 0       Secured with: tape       Ease of procedure: easy       Dentition: Intact and Unchanged    Medication(s) Administered   Medication Administration Time: 3/8/2024 7:40 AM

## 2024-03-09 ENCOUNTER — APPOINTMENT (OUTPATIENT)
Dept: GENERAL RADIOLOGY | Facility: CLINIC | Age: 59
End: 2024-03-09
Attending: STUDENT IN AN ORGANIZED HEALTH CARE EDUCATION/TRAINING PROGRAM
Payer: COMMERCIAL

## 2024-03-09 ENCOUNTER — APPOINTMENT (OUTPATIENT)
Dept: PHYSICAL THERAPY | Facility: CLINIC | Age: 59
End: 2024-03-09
Attending: ORTHOPAEDIC SURGERY
Payer: COMMERCIAL

## 2024-03-09 VITALS
OXYGEN SATURATION: 96 % | BODY MASS INDEX: 42.01 KG/M2 | DIASTOLIC BLOOD PRESSURE: 84 MMHG | HEART RATE: 60 BPM | WEIGHT: 300.1 LBS | RESPIRATION RATE: 18 BRPM | TEMPERATURE: 99.5 F | HEIGHT: 71 IN | SYSTOLIC BLOOD PRESSURE: 145 MMHG

## 2024-03-09 LAB
ANION GAP SERPL CALCULATED.3IONS-SCNC: 12 MMOL/L (ref 7–15)
BUN SERPL-MCNC: 12.3 MG/DL (ref 6–20)
CALCIUM SERPL-MCNC: 9.1 MG/DL (ref 8.6–10)
CHLORIDE SERPL-SCNC: 105 MMOL/L (ref 98–107)
CREAT SERPL-MCNC: 0.89 MG/DL (ref 0.67–1.17)
DEPRECATED HCO3 PLAS-SCNC: 23 MMOL/L (ref 22–29)
EGFRCR SERPLBLD CKD-EPI 2021: >90 ML/MIN/1.73M2
GLUCOSE SERPL-MCNC: 151 MG/DL (ref 70–99)
HGB BLD-MCNC: 15.2 G/DL (ref 13.3–17.7)
POTASSIUM SERPL-SCNC: 4.5 MMOL/L (ref 3.4–5.3)
SODIUM SERPL-SCNC: 140 MMOL/L (ref 135–145)

## 2024-03-09 PROCEDURE — 97161 PT EVAL LOW COMPLEX 20 MIN: CPT | Mod: GP

## 2024-03-09 PROCEDURE — 250N000011 HC RX IP 250 OP 636: Performed by: ORTHOPAEDIC SURGERY

## 2024-03-09 PROCEDURE — 97530 THERAPEUTIC ACTIVITIES: CPT | Mod: GP

## 2024-03-09 PROCEDURE — 97116 GAIT TRAINING THERAPY: CPT | Mod: GP

## 2024-03-09 PROCEDURE — 36415 COLL VENOUS BLD VENIPUNCTURE: CPT | Performed by: STUDENT IN AN ORGANIZED HEALTH CARE EDUCATION/TRAINING PROGRAM

## 2024-03-09 PROCEDURE — 258N000003 HC RX IP 258 OP 636: Performed by: STUDENT IN AN ORGANIZED HEALTH CARE EDUCATION/TRAINING PROGRAM

## 2024-03-09 PROCEDURE — 85018 HEMOGLOBIN: CPT | Performed by: STUDENT IN AN ORGANIZED HEALTH CARE EDUCATION/TRAINING PROGRAM

## 2024-03-09 PROCEDURE — 72040 X-RAY EXAM NECK SPINE 2-3 VW: CPT

## 2024-03-09 PROCEDURE — 80048 BASIC METABOLIC PNL TOTAL CA: CPT | Performed by: STUDENT IN AN ORGANIZED HEALTH CARE EDUCATION/TRAINING PROGRAM

## 2024-03-09 PROCEDURE — 250N000011 HC RX IP 250 OP 636: Performed by: STUDENT IN AN ORGANIZED HEALTH CARE EDUCATION/TRAINING PROGRAM

## 2024-03-09 PROCEDURE — 250N000013 HC RX MED GY IP 250 OP 250 PS 637: Performed by: STUDENT IN AN ORGANIZED HEALTH CARE EDUCATION/TRAINING PROGRAM

## 2024-03-09 RX ORDER — ASPIRIN 81 MG/1
81 TABLET ORAL DAILY
Status: SHIPPED
Start: 2024-03-10

## 2024-03-09 RX ORDER — METHOCARBAMOL 750 MG/1
750 TABLET, FILM COATED ORAL EVERY 6 HOURS PRN
Qty: 30 TABLET | Refills: 0 | Status: SHIPPED | OUTPATIENT
Start: 2024-03-09

## 2024-03-09 RX ORDER — POLYETHYLENE GLYCOL 3350 17 G/17G
17 POWDER, FOR SOLUTION ORAL DAILY
Qty: 238 G | Refills: 0 | Status: SHIPPED | OUTPATIENT
Start: 2024-03-09 | End: 2024-03-23

## 2024-03-09 RX ORDER — OXYCODONE HYDROCHLORIDE 5 MG/1
5 TABLET ORAL EVERY 4 HOURS PRN
Qty: 20 TABLET | Refills: 0 | Status: SHIPPED | OUTPATIENT
Start: 2024-03-09

## 2024-03-09 RX ORDER — METHOCARBAMOL 750 MG/1
750 TABLET, FILM COATED ORAL EVERY 6 HOURS
Status: DISCONTINUED | OUTPATIENT
Start: 2024-03-09 | End: 2024-03-09 | Stop reason: HOSPADM

## 2024-03-09 RX ORDER — AMOXICILLIN 250 MG
2 CAPSULE ORAL 2 TIMES DAILY
Qty: 56 TABLET | Refills: 0 | Status: SHIPPED | OUTPATIENT
Start: 2024-03-09 | End: 2024-03-23

## 2024-03-09 RX ADMIN — DEXAMETHASONE SODIUM PHOSPHATE 4 MG: 4 INJECTION, SOLUTION INTRAMUSCULAR; INTRAVENOUS at 04:13

## 2024-03-09 RX ADMIN — ACETAMINOPHEN 975 MG: 325 TABLET, FILM COATED ORAL at 04:13

## 2024-03-09 RX ADMIN — LISINOPRIL 20 MG: 20 TABLET ORAL at 08:43

## 2024-03-09 RX ADMIN — ROSUVASTATIN CALCIUM 20 MG: 20 TABLET, FILM COATED ORAL at 08:43

## 2024-03-09 RX ADMIN — EZETIMIBE 10 MG: 10 TABLET ORAL at 08:43

## 2024-03-09 RX ADMIN — DEXAMETHASONE SODIUM PHOSPHATE 4 MG: 4 INJECTION, SOLUTION INTRAMUSCULAR; INTRAVENOUS at 09:54

## 2024-03-09 RX ADMIN — METHOCARBAMOL 750 MG: 750 TABLET ORAL at 08:43

## 2024-03-09 RX ADMIN — HYDROMORPHONE HYDROCHLORIDE 0.4 MG: 0.2 INJECTION, SOLUTION INTRAMUSCULAR; INTRAVENOUS; SUBCUTANEOUS at 08:43

## 2024-03-09 RX ADMIN — DOCUSATE SODIUM 50 MG AND SENNOSIDES 8.6 MG 2 TABLET: 8.6; 5 TABLET, FILM COATED ORAL at 08:43

## 2024-03-09 RX ADMIN — POLYETHYLENE GLYCOL 3350 17 G: 17 POWDER, FOR SOLUTION ORAL at 08:43

## 2024-03-09 RX ADMIN — CEFAZOLIN SODIUM 2 G: 2 INJECTION, SOLUTION INTRAVENOUS at 04:14

## 2024-03-09 RX ADMIN — OXYCODONE HYDROCHLORIDE 10 MG: 5 TABLET ORAL at 00:40

## 2024-03-09 RX ADMIN — HYDROXYZINE HYDROCHLORIDE 25 MG: 25 TABLET, FILM COATED ORAL at 04:21

## 2024-03-09 RX ADMIN — SODIUM CHLORIDE: 9 INJECTION, SOLUTION INTRAVENOUS at 00:50

## 2024-03-09 RX ADMIN — HYDROMORPHONE HYDROCHLORIDE 0.4 MG: 0.2 INJECTION, SOLUTION INTRAMUSCULAR; INTRAVENOUS; SUBCUTANEOUS at 02:07

## 2024-03-09 RX ADMIN — METOPROLOL SUCCINATE 25 MG: 25 TABLET, EXTENDED RELEASE ORAL at 09:54

## 2024-03-09 ASSESSMENT — ACTIVITIES OF DAILY LIVING (ADL)
ADLS_ACUITY_SCORE: 24
ADLS_ACUITY_SCORE: 23
ADLS_ACUITY_SCORE: 23
ADLS_ACUITY_SCORE: 25
ADLS_ACUITY_SCORE: 24
ADLS_ACUITY_SCORE: 23
ADLS_ACUITY_SCORE: 23
ADLS_ACUITY_SCORE: 25
ADLS_ACUITY_SCORE: 23
ADLS_ACUITY_SCORE: 23
ADLS_ACUITY_SCORE: 25

## 2024-03-09 NOTE — PROGRESS NOTES
A&OX4, vitally stable  on 2L NC, A1GB/W, uses BIPAP at night,  Cervical collar in place, Dressing to the anterior neck CDI, DELANO in place with good output, Pain controlled with PRN oxycodone, Dilaudid, and Atarax. Discharge pending

## 2024-03-09 NOTE — PROGRESS NOTES
03/09/24 0900   Appointment Info   Signing Clinician's Name / Credentials (PT) Carrie Henley DPT   Rehab Comments (PT) C spine precautions   Living Environment   People in Home alone  (will be staying with brother)   Current Living Arrangements house   Home Accessibility stairs to enter home   Number of Stairs, Main Entrance 2   Stair Railings, Main Entrance none   Transportation Anticipated car, drives self;family or friend will provide   Living Environment Comments Lives alone so will be staying with his brother in house with 2 WANDA, can stay on one level. Brother will be able to provicde 24/7 supervision but uses a walker so will not be able to help physically.   Self-Care   Usual Activity Tolerance excellent   Current Activity Tolerance moderate   Regular Exercise Yes   Activity/Exercise Type walking;strength training   Exercise Amount/Frequency 3-5 times/wk   Equipment Currently Used at Home grab bar, toilet;grab bar, tub/shower;shower chair   Fall history within last six months yes   Number of times patient has fallen within last six months 6   Activity/Exercise/Self-Care Comment IND with all ADLS and IALDS, no AD. Walks 3 days a week and does OPPT   General Information   Onset of Illness/Injury or Date of Surgery 03/08/24   Referring Physician Kelle Carlson, PACharleneC   Patient/Family Therapy Goals Statement (PT) go home   Pertinent History of Current Problem (include personal factors and/or comorbidities that impact the POC) Discectomy and fusion C4-7  POD 1   Existing Precautions/Restrictions brace worn when out of bed;fall;spinal   Cognition   Affect/Mental Status (Cognition) flat/blunted affect   Orientation Status (Cognition) oriented x 4   Follows Commands (Cognition) WFL   Pain Assessment   Patient Currently in Pain Yes, see Vital Sign flowsheet  (Neck)   Integumentary/Edema   Integumentary/Edema Comments Swelling in neck, brace OOB   Posture    Posture Forward head position;Kyphosis   Range of  Motion (ROM)   Range of Motion ROM deficits secondary to surgical procedure;ROM deficits secondary to pain;ROM deficits secondary to swelling;ROM deficits secondary to weakness   Strength (Manual Muscle Testing)   Strength (Manual Muscle Testing) Deficits observed during functional mobility   Bed Mobility   Comment, (Bed Mobility) IND with HOB raised, has this at home   Transfers   Comment, (Transfers) SBA FWW   Gait/Stairs (Locomotion)   Comment, (Gait/Stairs) SBA FWW   Balance   Balance Comments SBA   Clinical Impression   Criteria for Skilled Therapeutic Intervention Yes, treatment indicated   PT Diagnosis (PT) impaired gait   Influenced by the following impairments pain   Functional limitations due to impairments impaired mobiltiy   Clinical Presentation (PT Evaluation Complexity) stable   Clinical Presentation Rationale cliical judgement   Clinical Decision Making (Complexity) low complexity   Planned Therapy Interventions (PT) balance training;bed mobility training;cryotherapy;gait training;home exercise program;ROM (range of motion);strengthening;stair training;stretching;transfer training   Risk & Benefits of therapy have been explained evaluation/treatment results reviewed;care plan/treatment goals reviewed;risks/benefits reviewed;current/potential barriers reviewed;participants voiced agreement with care plan;participants included;patient   PT Total Evaluation Time   PT Eval, Low Complexity Minutes (17323) 8   Physical Therapy Goals   PT Frequency One time eval and treatment only   PT Predicted Duration/Target Date for Goal Attainment 03/09/24   PT Goals Bed Mobility;Transfers;Gait;Stairs   PT: Bed Mobility Supervision/stand-by assist;Supine to/from sit;Rolling;Bridging   PT: Transfers Supervision/stand-by assist;Sit to/from stand;Bed to/from chair;Assistive device   PT: Gait Supervision/stand-by assist;Assistive device   PT: Stairs Supervision/stand-by assist;2 stairs   Interventions   Interventions Quick  Adds Therapeutic Activity;Gait Training   Therapeutic Activity   Therapeutic Activities: dynamic activities to improve functional performance Minutes (81316) 10   Symptoms Noted During/After Treatment None   Treatment Detail/Skilled Intervention Edu on BLT precautions, handout provided. Edu on car transfer sequencing with demo, pt verbalized understanding. SBA transfer EOB with HOB raised whic pt will have at home. Aspen collar on. Cues for LB dressing within precautions, completed IND with increased time. Edu on activity recs.   Gait Training   Gait Training Minutes (65023) 8   Symptoms Noted During/After Treatment (Gait Training) none   Treatment Detail/Skilled Intervention Amb 400ft SBA FWW, therapist adjusted walker to crrect height. Edu on stair sequencing pt complete x4 steps with B rails and then no rails to [facility] home. Edu on safety and SBA from brother   PT Discharge Planning   PT Plan DC   PT Discharge Recommendation (DC Rec) home with assist   PT Rationale for DC Rec Pt moving SBA and will have assist from brother and sister in law. Anticipate when medically ready pt can DC home, has met all IPPT goals.   PT Brief overview of current status SBA FWW   Total Session Time   Timed Code Treatment Minutes 18   Total Session Time (sum of timed and untimed services) 26   Physical Therapy Discharge Summary    Reason for therapy discharge:    All goals and outcomes met, no further needs identified.    Progress towards therapy goal(s). See goals on Care Plan in [facility] electronic health record for goal details.  Goals met    Therapy recommendation(s):    Continue home exercise program.

## 2024-03-09 NOTE — PROGRESS NOTES
"Ortho Progress Note     Subjective:  No acute overnight events. Hypertensive overnight due to surgical pain compounding baseline HTN. Vitally stable on 2L NC, uses BIPAP at night. Currently satting at 98% on room air. Mobilized to the bathroom yesterday. Voiding well.     Reports significant amount of surgical neck pain this morning. He notes better response to muscle relaxants than narcotics. Some GI upset with meds.     Swallowing is uncomfortable as expected, but able to swallow liquids and soft foods. Reports improvement of pre-operative nerve pain. Numbness and weakness largely stable. No new or worsening LE radicular pain, paresthesias, weakness.     Objective:  /81 (BP Location: Left arm, Patient Position: Semi-Kirby's, Cuff Size: Adult Large)   Pulse 51   Temp 97.3  F (36.3  C) (Oral)   Resp 18   Ht 1.803 m (5' 11\")   Wt 136.1 kg (300 lb 1.6 oz)   SpO2 97%   BMI 41.86 kg/m    Gen: alert and appropriately interactive, no acute distress  CV: visible skin appears well-perfused, extremities warm to touch   Resp: breathing equal and non-labored, no wheezing  MSK:       Spine:   Skin: Incision without evidence of infection. Dressing changed this morning.    Sensation:      R       L    C5:   Intact   Intact    C6:     Intact   Intact    C7:   Intact   Intact    C8:   Intact   Intact     Motor:     R L    C5: Deltoid   5  1    C6:   Biceps    5  4    C7: Triceps    5  5    C8:     5  5    T1: Intrinsics  5 5      No results found for: \"HGB\"    Drain: 35/15 ml - removed today     Assessment/Plan:  POD 1 s/p C4-7 ACDF performed by Dr. Jara 3/08/2024. Recovering as expected.     Surgical neck pain quite bothersome this morning. He notes better response to muscle relaxants than narcotics. Will change Robaxin to scheduled dosing. Continue IV decadron as ordered.  PRN atarax available if needed, though would need to consider O2 sats/sedation level.     Recommend taking oral medications with food to " mitigate GI upset.     Monitor O2 saturation. If requires supplemental oxygen during the daytime, notify provider. Will consider hospitalist consult.     Other goals of the day: mobilization/evaluation with PT/OT, pain control, xrays when able.       -Activity:                                       Up with assist  -Weight Bearing Status:            WBAT   -Bracing:                                      Hard collar. May briefly remove for eating and hygiene if desired, and may remove to sleep.  -Antibiotics:                                  Ancef x24h  -Anticoagulation:                        SCDs  -Steroids:                                      Decadron 4 mg IV q6h x24h  -Pain control:                               IV and PO, wean to PO as able  -Drain:                                          monitor and chart output  -Dressing:                                     Ok to shower with dressing in place, dressing ok to get wet.  -Diet:                                              ADAT     -Disposition:                                 Pending medical stability, PT, anticipate discharge later today vs tomorrow   -Follow up:                                   2 weeks in clinic    Kelle Carlson PA-C  Orthopedic Spine Surgery  [facility]

## 2024-03-09 NOTE — PROVIDER NOTIFICATION
Ileana WEBBER patient has metoprolol ordered but there are no parameters. Pulse is 51. Waiting for call back with parameters.

## 2024-03-09 NOTE — PROGRESS NOTES
Writer administered 0.4mg of IV dilaudid this morning before PT, writer told patient she was administering the dilaudid, stopped the fluids, disconnected, administered dilaudid then proceeded to re hook up IVF. Per PT patient told them he was never given any pain meds and that writer did not give patient dilaudid nor did he get any pain medications since he got back from surgery. Patient received all pain medications available upon arrival to floor (see MAR). Patient proceeded to call emanuelr a liar when referring to dilaudid administration.

## 2024-03-09 NOTE — PLAN OF CARE
Goal Outcome Evaluation:  Pt is discharging home with brother today around 1330. Discharge instructions and medications reviewed with patient, questions answered and verbalized understanding. IV removed and patient belongings accounted for.

## 2024-03-09 NOTE — PROGRESS NOTES
"[id number]    Pt is A&Ox4, VSS on 2 L via NC, currently on his home BIPAP. A/1 with walker. C collar in place. Surgical dressing CDI. DELANO drain for this shift 35 ml. Bed alarm on for safety.    BP (!) 144/81   Pulse 65   Temp 98.6  F (37  C) (Oral)   Resp 18   Ht 1.803 m (5' 11\")   Wt 136.1 kg (300 lb 1.6 oz)   SpO2 95%   BMI 41.86 kg/m      "

## 2024-03-10 ENCOUNTER — HOSPITAL ENCOUNTER (EMERGENCY)
Facility: CLINIC | Age: 59
Discharge: HOME | End: 2024-03-10
Attending: STUDENT IN AN ORGANIZED HEALTH CARE EDUCATION/TRAINING PROGRAM | Admitting: STUDENT IN AN ORGANIZED HEALTH CARE EDUCATION/TRAINING PROGRAM
Payer: COMMERCIAL

## 2024-03-10 VITALS
OXYGEN SATURATION: 96 % | HEART RATE: 64 BPM | TEMPERATURE: 97 F | SYSTOLIC BLOOD PRESSURE: 139 MMHG | RESPIRATION RATE: 18 BRPM | DIASTOLIC BLOOD PRESSURE: 82 MMHG

## 2024-03-10 DIAGNOSIS — L76.82 POSTOPERATIVE COMPLICATION OF SKIN INVOLVING DRAINAGE FROM SURGICAL WOUND: ICD-10-CM

## 2024-03-10 PROCEDURE — 99282 EMERGENCY DEPT VISIT SF MDM: CPT

## 2024-03-10 ASSESSMENT — COLUMBIA-SUICIDE SEVERITY RATING SCALE - C-SSRS
6. HAVE YOU EVER DONE ANYTHING, STARTED TO DO ANYTHING, OR PREPARED TO DO ANYTHING TO END YOUR LIFE?: NO
1. IN THE PAST MONTH, HAVE YOU WISHED YOU WERE DEAD OR WISHED YOU COULD GO TO SLEEP AND NOT WAKE UP?: NO
2. HAVE YOU ACTUALLY HAD ANY THOUGHTS OF KILLING YOURSELF IN THE PAST MONTH?: NO

## 2024-03-10 ASSESSMENT — ACTIVITIES OF DAILY LIVING (ADL): ADLS_ACUITY_SCORE: 38

## 2024-03-11 ENCOUNTER — PATIENT OUTREACH (OUTPATIENT)
Dept: CARE COORDINATION | Facility: CLINIC | Age: 59
End: 2024-03-11
Payer: COMMERCIAL

## 2024-03-11 NOTE — ED TRIAGE NOTES
Patient presents with serosanguinous drainage from incisions to neck after c-spine fusion on 3/8.  Drainage started about 1 hour ago.  He denies fevers, redness, or worsening pain.     Triage Assessment (Adult)       Row Name 03/10/24 1925          Triage Assessment    Airway WDL WDL        Respiratory WDL    Respiratory WDL WDL        Skin Circulation/Temperature WDL    Skin Circulation/Temperature WDL WDL        Cardiac WDL    Cardiac WDL WDL        Peripheral/Neurovascular WDL    Peripheral Neurovascular WDL WDL        Cognitive/Neuro/Behavioral WDL    Cognitive/Neuro/Behavioral WDL WDL

## 2024-03-11 NOTE — DISCHARGE SUMMARY
ORTHOPAEDIC DISCHARGE SUMMARY     Date of Admission: 3/8/2024  Date of Discharge: 3/9/2024  1:14 PM  Disposition: Home  Surgeon: Mj Jara MD      DISCHARGE DIAGNOSIS:  Cervical spinal stenosis [M48.02]  Cervical myelopathy (H) [G95.9]    PROCEDURES: Procedure(s):  CERVICAL 4 TO CERVICAL 7 ANTERIOR CERVICAL DISCECTOMY AND CERVICAL 4 TO CERVICAL 7 FUSION on 3/8/2024    BRIEF HISTORY:  This was a planned admission after the above elective procedure.    HOSPITAL COURSE:    Surgery was uncomplicated. Dino Best has done well post-operatively.     The patient received routine nursing cares and is medically stable. Patient was mildly hypertensive in the post-operative timeframe as expected in the setting of surgical pain compounding baseline HTN. PTA blood pressure medication was given throughout hospitalization. Vital signs are otherwise stable. He did require 2L supplemental oxygen via nasal cannula overnight in the setting of KMAAR. Maintained saturations well during the day on room air. He should follow up with his PCP as necessary in the outpatient setting. The patient is tolerating a regular diet without GI distress/nausea or vomiting. Voiding spontaneously. All PT/OT goals have been met for safe mobility. Pain is now controlled on oral medications which will be available on discharge. Stool softeners have been used while taking pain medications to help prevent constipation. Dino Best is deemed medically safe to discharge.     Antibiotics:  Given periop and 24 hours postop.  PT Progress:  Has met PT/OT goals for safe mobility.   Pain Meds:  Weaned off all IV pain meds by discharge.  Inpatient Events: No significant events or complications.     Discharge orders and instructions as below.    FOLLOWUP:    Follow up in 2 weeks in Dr. Jara's clinic  Follow up with PCP as necessary for post-operative check in        PLANNED DISCHARGE ORDERS:     DVT Prophylaxis: Mobilization        Discharge Medication List as of  3/9/2024 12:36 PM        START taking these medications    Details   !! polyethylene glycol (MIRALAX) 17 GM/Dose powder Take 17 g by mouth daily for 14 days, Disp-238 g, R-0, E-Prescribe      !! senna-docusate (SENOKOT-S/PERICOLACE) 8.6-50 MG tablet Take 2 tablets by mouth 2 times daily for 14 days, Disp-56 tablet, R-0, E-Prescribe       !! - Potential duplicate medications found. Please discuss with provider.        CONTINUE these medications which have CHANGED    Details   aspirin 81 MG EC tablet Take 1 tablet (81 mg) by mouth daily, No Print Out      methocarbamol (ROBAXIN) 750 MG tablet Take 1 tablet (750 mg) by mouth every 6 hours as needed for muscle spasms, Disp-30 tablet, R-0, E-Prescribe      oxyCODONE (ROXICODONE) 5 MG tablet Take 1 tablet (5 mg) by mouth every 4 hours as needed for moderate to severe pain, Disp-20 tablet, R-0, E-Prescribe           CONTINUE these medications which have NOT CHANGED    Details   acetaminophen (TYLENOL) 500 MG tablet Take 500-1,000 mg by mouth every 6 hours as needed for mild pain, Historical      cetirizine (ZYRTEC) 10 MG tablet Take 10 mg by mouth daily as needed for allergies, Historical      ezetimibe (ZETIA) 10 MG tablet Take 10 mg by mouth daily, Historical      lisinopril (ZESTRIL) 20 MG tablet Take 20 mg by mouth daily, Historical      metoprolol succinate ER (TOPROL XL) 25 MG 24 hr tablet Take 25 mg by mouth daily, Historical      nitroGLYcerin (NITROSTAT) 0.4 MG sublingual tablet Place 0.4 mg under the tongue, Historical      !! polyethylene glycol (MIRALAX) 17 GM/Dose powder Take 1 Capful by mouth daily as needed for constipation, Historical      rosuvastatin (CRESTOR) 20 MG tablet Take 20 mg by mouth daily, Historical      !! senna-docusate (SENOKOT-S/PERICOLACE) 8.6-50 MG tablet Take 1 tablet by mouth daily as needed for constipation, Historical      tadalafil (ADCIRCA/CIALIS) 20 MG tablet Take 20 mg by mouth as needed, Historical       !! - Potential duplicate  medications found. Please discuss with provider.            Discharge Procedure Orders   Discharge Instructions   Order Comments: Review outpatient procedure discharge instructions as directed by Provider.     Discharge Instructions - Neck brace   Order Comments: Wear neck brace when out of bed.     Discharge Instructions - Contact surgery team   Order Comments: Contact surgical team with any new swelling or tightness to the throat/incision site if it is causing discomfort.     Call 911 if it becomes difficult to breath as this is a medical emergency.     Discharge Instructions - Shower with incision NOT covered   Order Comments: You may shower the day after surgery.  You do not need to cover your surgical incision in the shower and may allow water and soap to run over top of the dressing (it is waterproof) or incision. Do not soak or submerge the incision underwater until cleared to do so, usually 2 or 3 weeks after surgery.     Discharge Instructions - No tub bathing   Order Comments: Tub bathing, swimming, or any other activities that will cause your incision to be submerged in water should be avoided until allowed by your Provider.     Discharge Instructions - Change dressing   Order Comments: Wash hands prior to changing dressing daily. If no drainage on dressing, may leave open to air.     Discharge Instructions - Diet   Order Comments: Diet as tolerated. Return to diet before surgery.     Discharge Instructions - Lifting Limit (specify)   Order Comments: Lifting limit of  10 pounds until seen at Post-op follow up appointment.     Return to Clinic - in 2 weeks   Order Comments: Return to Clinic in 2 weeks     Discharge Instructions   Order Comments: Check with Provider for instructions about when to start anticoagulant medication.     No Aspirin or NSAID products   Order Comments: No aspirin or non-steroidal anti-inflammatory drugs (NSAIDs) such as ibuprofen or naproxen until cleared at post-operative  appointment     No driving or operating machinery while in a cervical collar   Order Comments: Until follow-up appointment.     No Alcohol   Order Comments: No Alcohol for 24 hours after procedure or while taking narcotic pain medication     Reason for your hospital stay   Order Comments: C4-7 Anterior Cervical Discectomy and Fusion     Follow-up and recommended labs and tests    Order Comments: Follow up in 2 weeks in Dr. Jara's clinic  Follow up with PCP as needed for post-operative check in     Activity   Order Comments: Your activity upon discharge: weight bear as tolerated. See formal activity restrictions in discharge instructions.     Order Specific Question Answer Comments   Is discharge order? Yes      Discharge Instructions   Order Comments: Care after Spine Surgery - Dr. Mj Jara    The following information will help you through your recovery at home.    Pain  - It is normal for you to experience some pain in the area of your incision after your surgery, and often between the shoulder blades.  This will improve over the coming couple of weeks. You may use ice and/or heat on your shoulders as tolerated. Do not place ice or heat directly on your incision or near your incision site.    - You should call Dr. Jara's office if arm pain returns suddenly and does not improve over 24 hours.    Pain Management  - Take your prescribed pain medication as needed and directed.  You may use Tylenol and methocarbamol (muscle relaxant) for baseline pain control. You may use the oxycodone for breakthrough pain. Plan to wean off of oxycodone over the next several days. You may continue to use Tylenol and methocarbamol for your discomfort when you no longer need the narcotic pain medication.     - Do not use ibuprofen (Advil), naproxen (Aleve), diclofenac (Voltaren), meloxicam or other NSAIDs until cleared to do so by Dr. Jara's team. Use of these types of medications can delay appropriate bone healing.     - If you need a  refill on your pain medication call [phone], please allow 24 hours for your prescription to be refilled, Dr. Jara's office does not refill pain medications on Friday afternoons.    Bracing:  - You should have received a hard cervical collar during the hospital.  You should wear this at all times for the next several weeks, although you may briefly remove it to eat, shower, and when relaxing in the upright position. Make sure to maintain a neutral position as much as possible and avoid turning your neck while the collar is removed.    - You should wear the cervical collar while sleeping for the first 2 weeks.   - Further direction regarding the cervical collar will be given at your post-operative assessments.    Home Medications  - If you take a blood thinner (e.g. aspirin, warfarin, Xeralto, Eliquis, etc...) at baseline, wait until two days after the operation to start taking it again.  This is to lower the risk of a blood collection pushing on the nerves in the surgery site.  If after starting your blood thinner again, if you notice severe worsening neck swelling or arm pain, contact Dr Jara's office immediately, as this could represent a blood collection forming.    Activity  - You may increase your activity as tolerated; walking is the best form of exercise after spine surgery.    - For six weeks after surgery avoid bending, lifting, and twisting of the neck (BLT).  Avoid activities such as vacuuming, raking and shoveling.   - Try to limit your lifting to 5-10 lbs during the first 2 weeks and then increase to 20-25lbs over the next 6 weeks.  - Do not perform any activities which place strain on your neck, chest, shoulders, or upper back   - You may return to work approximately 1- 2 weeks after your surgery if you have a sedentary or desk type job.  If you have a physical job Dr. Jara and his staff will help you determine a return to work plan.    - You may resume sexual activity when you feel ready.  Stop if  you have pain.    Driving  - Do not drive until cleared to do so by Dr. Jara's team.    Incision Site   - Keep a dressing over your incision for the first week.  If the dressing you left the hospital with remains intact and in place, with no water beneath it, you may leave this on.  If water gets underneath it, you should remove it and replace it with a dry gauze dressing (can place a tegaderm or tape over the top of the gauze).  Wash hands prior to changing the dressing. If after a week there is no drainage on dressing, you may leave the incision open to air.  If there is ongoing drainage five days after surgery, please contact Dr Jara's office.    Diet   - Start with eating soft foods and taking small bites. Gradually progress your diet as tolerated.   - Eat a healthy diet; this will help your recovery.  - Drink plenty of fluids, water, milk or juice.  - Take your prescribed stool softener as directed.   - If you have trouble with constipation you should eat more fiber, drink more fluids, increase your walking or try an over the counter laxative.    Follow-up Visits  - You will see Kelle Carlson PA-C for your 2-week post-operative follow up appointment. You will see Dr. Jara for your 6-week post-operative follow up appointment. You should have had both of these appointments scheduled for you at the same time your surgery was scheduled. If you did not, please call Dr. Jara's office when you get home from your surgery.  - Write down any questions you have about your surgery, recovery, return to work and other topics you wished to be covered at your post-op visit.  This way, we will be able to address all of your questions at your next visit.  - Call Dr. Jara's office if you have any questions or concerns.    When to Call your Doctor:  - If you have any redness, warmth or swelling at the incision site.  - If your incision opens up.  - If you have increasing drainage from your incision.  - If you have a  temperature greater than 100.5 degrees Fahrenheit.  - If you develop dramatic swelling in the front of your neck.  If this swelling is making it difficult to breathe, go immediately to the emergency department, by ambulance if necessary.     Diet   Order Comments: Follow this diet upon discharge:     Start with small bites, soft foods, and liquids and slowly progress your diet as tolerated.     Order Specific Question Answer Comments   Is discharge order? Yes            Kelle Carlson PA-C  Orthopaedic Spine Surgery  [facility]

## 2024-03-11 NOTE — ED PROVIDER NOTES
History     Chief Complaint:  Post-op Problem       HPI   Dino Best is a 58 year old male with history of recent cervical spinal fusion completed on 3/8/24, who presents with wound drainage. Patient states that 1 hour PTA, he experienced blood-tinged drainage from wound site. Denies any purulent drainage. States that he has not changed the bandages.       Independent Historian:   None - Patient Only    Review of External Notes:   Reviewed operative note from 3/8/24   Reviewed discharge summary from 3/3/24      Medications:    acetaminophen (TYLENOL) 500 MG tablet  aspirin 81 MG EC tablet  cetirizine (ZYRTEC) 10 MG tablet  ezetimibe (ZETIA) 10 MG tablet  lisinopril (ZESTRIL) 20 MG tablet  methocarbamol (ROBAXIN) 750 MG tablet  metoprolol succinate ER (TOPROL XL) 25 MG 24 hr tablet  nitroGLYcerin (NITROSTAT) 0.4 MG sublingual tablet  oxyCODONE (ROXICODONE) 5 MG tablet  polyethylene glycol (MIRALAX) 17 GM/Dose powder  polyethylene glycol (MIRALAX) 17 GM/Dose powder  rosuvastatin (CRESTOR) 20 MG tablet  senna-docusate (SENOKOT-S/PERICOLACE) 8.6-50 MG tablet  senna-docusate (SENOKOT-S/PERICOLACE) 8.6-50 MG tablet  tadalafil (ADCIRCA/CIALIS) 20 MG tablet        Past Medical History:    Past Medical History:   Diagnosis Date    Arthritis of knee     CAD (coronary artery disease)     Diabetes (H)     HTN (hypertension)     Motion sickness     PONV (postoperative nausea and vomiting)     Sleep apnea     Uncomplicated asthma        Past Surgical History:    Past Surgical History:   Procedure Laterality Date    COLONOSCOPY      ENT SURGERY      ORTHOPEDIC SURGERY Left     rotator cuff    STENT, CORONARY, CR  07/01/2004        Physical Exam   Patient Vitals for the past 24 hrs:   BP Temp Temp src Pulse Resp SpO2   03/10/24 1924 139/82 97  F (36.1  C) Temporal 64 18 96 %        Physical Exam  General: Alert and cooperative with exam. Patient in no apparent distress. Normal mentation.  Head:  Scalp is NC/AT  Eyes:  EOM  intact  ENT:  The external nose and ears are normal.   Neck:  Aspen collar in place. Dressings to anterior neck surgical site intact with mild serosanguinous fluid present. No surrounding erythema or swelling, no purulent drainage.   CV:  Warm and well perfused  Resp:  Breathing comfortably on room air  Skin:  Warm and dry, No rash or lesions noted.  Neuro:  Oriented x 3. No gross motor deficits.      Emergency Department Course     Procedures   None    Emergency Department Course & Assessments:    Interventions:  Medications - No data to display       Independent Interpretation (X-rays, CTs, rhythm strip):  None    Consultations/Discussion of Management or Tests:  None        Social Determinants of Health affecting care:   None    Disposition:  The patient was discharged.     Impression & Plan    CMS Diagnoses: None      Medical Decision Making:  Dino Best is a 58 year old male who presents with drainage from his surgical site.  Cervical fusion surgery completed 2 days ago, concern for drainage today.  Drainage occurred 1 hour prior to arrival.  On exam, Aspen collar in place along with wound dressings to the anterior neck.  Aspen collar removed while head was stabilized and incision sites were inspected.  There is blood-tinged serosanguineous fluid present slightly oozing from the wound.  No evidence of surrounding erythema, swelling, warmth, purulent drainage.  Discussed with patient that some drainage is normal postop and I do not have concern for the amount of fluid present at this time.  There is no evidence of infection warranting need for antibiotics.  Patient has follow-up planned with surgical team in their outpatient clinic in 2 weeks however I recommend he follow-up with them in clinic in the next few days if serosanguineous fluid continues to be bothersome or increasing production.  Also encouraged him to return to ER should he develop any signs or symptoms of infection including increased redness,  swelling, or drainage that is puslike in appearance.  Patient voiced understanding and agreement with this.  He is safe for discharge home.      Diagnosis:    ICD-10-CM    1. Postoperative complication of skin involving drainage from surgical wound  L76.82            Discharge Medications:  Discharge Medication List as of 3/10/2024  8:14 PM             3/10/2024   Alem Flynn, Alem Greenberg PA-C  03/10/24 2154

## 2024-03-11 NOTE — DISCHARGE INSTRUCTIONS
Follow up with your surgical team if symptoms are persistent for the next 2 days.  If symptoms worsen, return to ER.  It appears that your surgeon recommended follow-up within 2 weeks in clinic however if symptoms of drainage continue to be persistent for the next 2 days, I recommend you see them sooner than later.

## 2024-03-11 NOTE — PROGRESS NOTES
"Clinic Care Coordination Contact  [facility]: Post-Discharge Note  SITUATION                                                      Admission:    Admission Date: 03/08/24   Reason for Admission: cervical spinal fusion  Discharge:   Discharge Date: 03/09/24  Discharge Diagnosis: cervical spinal fusion    BACKGROUND                                                      Per hospital discharge summary and inpatient provider notes:  Dino Best is a 58 year old male with history of recent cervical spinal fusion completed on 3/8/24, who presents with wound drainage. Patient states that 1 hour PTA, he experienced blood-tinged drainage from wound site. Denies any purulent drainage. States that he has not changed the bandages.      ASSESSMENT           Discharge Assessment  How are you doing now that you are home?: \" Doing okay \"  How are your symptoms? (Red Flag symptoms escalate to triage hotline per guidelines): Unchanged;Improved  Does the patient have their discharge instructions? : Yes  Does the patient have questions regarding their discharge instructions? : No  Were you started on any new medications or were there changes to any of your previous medications? : Yes  Does the patient have all of their medications?: Yes  Do you have questions regarding any of your medications? : No  Do you have all of your needed medical supplies or equipment (DME)?  (i.e. oxygen tank, CPAP, cane, etc.): Yes  Discharge follow-up appointment scheduled within 14 calendar days? : Yes  Discharge Follow Up Appointment Date: 03/28/24  Discharge Follow Up Appointment Scheduled with?: Specialty Care Provider    Post-op ([facility] Only)  If the patient had a surgery or procedure, do they have any questions for a nurse?: No             PLAN                                                      Outpatient Plan:  Follow up with your surgical team if symptoms are persistent for the  next 2 days. If symptoms worsen, return to ER. It appears that " your  surgeon recommended follow-up within 2 weeks in clinic however if  symptoms of drainage continue to be persistent for the next 2 days, I  recommend you see them sooner than later.    No future appointments.      For any urgent concerns, please contact our 24 hour nurse triage line: [phone])         Adrianna Polanco MA

## 2024-08-16 ENCOUNTER — TRANSFERRED RECORDS (OUTPATIENT)
Dept: HEALTH INFORMATION MANAGEMENT | Facility: CLINIC | Age: 59
End: 2024-08-16

## 2024-12-03 ENCOUNTER — OFFICE VISIT (OUTPATIENT)
Dept: INTERNAL MEDICINE | Facility: CLINIC | Age: 59
End: 2024-12-03
Payer: COMMERCIAL

## 2024-12-03 VITALS
HEIGHT: 71 IN | DIASTOLIC BLOOD PRESSURE: 81 MMHG | BODY MASS INDEX: 44.1 KG/M2 | TEMPERATURE: 98.1 F | OXYGEN SATURATION: 97 % | WEIGHT: 315 LBS | SYSTOLIC BLOOD PRESSURE: 128 MMHG | RESPIRATION RATE: 16 BRPM | HEART RATE: 63 BPM

## 2024-12-03 DIAGNOSIS — N52.9 ERECTILE DYSFUNCTION, UNSPECIFIED ERECTILE DYSFUNCTION TYPE: ICD-10-CM

## 2024-12-03 DIAGNOSIS — I10 BENIGN ESSENTIAL HYPERTENSION: ICD-10-CM

## 2024-12-03 DIAGNOSIS — I25.10 CORONARY ARTERY DISEASE INVOLVING NATIVE CORONARY ARTERY OF NATIVE HEART WITHOUT ANGINA PECTORIS: Primary | ICD-10-CM

## 2024-12-03 DIAGNOSIS — E78.5 HYPERLIPIDEMIA, UNSPECIFIED HYPERLIPIDEMIA TYPE: ICD-10-CM

## 2024-12-03 DIAGNOSIS — R73.02 IGT (IMPAIRED GLUCOSE TOLERANCE): ICD-10-CM

## 2024-12-03 RX ORDER — TADALAFIL 20 MG/1
20 TABLET ORAL PRN
Qty: 30 TABLET | Refills: 5 | Status: SHIPPED | OUTPATIENT
Start: 2024-12-03

## 2024-12-03 RX ORDER — ROSUVASTATIN CALCIUM 20 MG/1
20 TABLET, COATED ORAL DAILY
Qty: 90 TABLET | Refills: 3 | Status: SHIPPED | OUTPATIENT
Start: 2024-12-03

## 2024-12-03 RX ORDER — LISINOPRIL 20 MG/1
20 TABLET ORAL DAILY
Qty: 90 TABLET | Refills: 3 | Status: SHIPPED | OUTPATIENT
Start: 2024-12-03

## 2024-12-03 RX ORDER — METOPROLOL SUCCINATE 25 MG/1
25 TABLET, EXTENDED RELEASE ORAL DAILY
Qty: 90 TABLET | Refills: 3 | Status: SHIPPED | OUTPATIENT
Start: 2024-12-03

## 2024-12-03 RX ORDER — EZETIMIBE 10 MG/1
10 TABLET ORAL DAILY
Qty: 90 TABLET | Refills: 3 | Status: SHIPPED | OUTPATIENT
Start: 2024-12-03

## 2024-12-03 NOTE — PROGRESS NOTES
"HPI  59-year-old presents today to establish primary care.  He has a significant past medical history of coronary artery disease with percutaneous coronary interventions x 3 between 2004 and 2007.  He has been relatively stable since that time.  He presently does not report any exertional chest pain dizziness lightheadedness palpitations or other complaints.  Feels it is important that he establish with a cardiologist here as he has recently changed his insurance.  For lipids he has been taking a combination of rosuvastatin and ezetimibe.  He continues on metoprolol and lisinopril.  He is recently put on significant weight and is now committed to a more aggressive exercise regimen and working out at the gym.  He has done this in the past and found it is an effective way to help control his weight.  He has not a colonoscopy done in the last couple years he was willing to concede only for the COVID-vaccine today.  He has had impaired glucose tolerance in the past but no overt diabetes.  Past Medical History:   Diagnosis Date    Arthritis of knee     CAD (coronary artery disease)     Diabetes (H)     HTN (hypertension)     Motion sickness     PONV (postoperative nausea and vomiting)     Sleep apnea     Uncomplicated asthma      Past Surgical History:   Procedure Laterality Date    COLONOSCOPY      DISCECTOMY, FUSION CERVICAL ANTERIOR THREE+ LEVELS, COMBINED N/A 3/8/2024    Procedure: CERVICAL 4 TO CERVICAL 7 ANTERIOR CERVICAL DISCECTOMY AND CERVICAL 4 TO CERVICAL 7 FUSION;  Surgeon: Mj Jara MD;  Location: [facility] OR    ENT SURGERY      ORTHOPEDIC SURGERY Left     rotator cuff    STENT, CORONARY, CR  07/01/2004     Family History   Problem Relation Age of Onset    C.A.D. Father          Exam:  /81 (BP Location: Right arm, Patient Position: Sitting, Cuff Size: Adult Large)   Pulse 63   Temp 98.1  F (36.7  C) (Oral)   Resp 16   Ht 1.807 m (5' 11.14\")   Wt 143.8 kg (317 lb)   SpO2 97%   BMI 44.04 kg/m    317 " lbs 0 oz  The patient is alert, oriented with a clear sensorium.   Skin shows no lesions or rashes and good turgor.   Head is normocephalic and atraumatic.    Neck shows no nodes, thyromegaly.     Lungs are clear.   Heart shows normal S1 and S2 without murmur or gallop.    Extremities show some venous stasis changes superficial venous varicosities and trace bilateral pretibial edema.      ASSESSMENT  CAD S/P PCI x3  S/P Cervical discectomy and fusion  Hyperlipidemia on rosuvastatin/ezetimibe  Hypertension controlled on lisinopril  KAMAR  Venous insufficiency  IGT    Plan  Placed a referral to general cardiology will have him follow-up for fasting labs will update his immunizations with a COVID booster.  Return in 6 months for physical examination.  This note was completed using Dragon voice recognition software.      Hector Sidhu MD  General Internal Medicine  [facility]  [phone]

## 2025-04-01 ENCOUNTER — TELEPHONE (OUTPATIENT)
Dept: INTERNAL MEDICINE | Facility: CLINIC | Age: 60
End: 2025-04-01
Payer: COMMERCIAL

## 2025-04-01 DIAGNOSIS — M79.642 LEFT HAND PAIN: Primary | ICD-10-CM

## 2025-04-01 DIAGNOSIS — G56.02 CARPAL TUNNEL SYNDROME OF LEFT WRIST: ICD-10-CM

## 2025-04-01 DIAGNOSIS — Z98.1 S/P CERVICAL SPINAL FUSION: ICD-10-CM

## 2025-04-01 NOTE — TELEPHONE ENCOUNTER
[facility] Call Center    Phone Message    May a detailed message be left on voicemail: yes     Reason for Call: Other: Pt requesting a referral to ortho to see Dr. Timmy Mayen for hand surgery

## 2025-04-01 NOTE — TELEPHONE ENCOUNTER
Spoke to patient. Requesting Ortho referral to see Dr. Timmy Mayen at [facility] location for left hand pain. Not sure if pain is related to left carpal tunnel or a nerve problem from the cervical 4 to cervical 7 issue.     Ortho referral request to Dr. Sidhu to review.      Pepito Martínez CMA ([facility]) at 12:41 PM on 4/1/2025

## 2025-04-02 ENCOUNTER — PATIENT OUTREACH (OUTPATIENT)
Dept: CARE COORDINATION | Facility: CLINIC | Age: 60
End: 2025-04-02
Payer: COMMERCIAL

## 2025-04-03 NOTE — TELEPHONE ENCOUNTER
DIAGNOSIS: Left hand pain [M79.642]  S/P cervical spinal fusion [Z98.1]  Carpal tunnel syndrome of left wrist [G56.02] / Hector Sidhu MD / medica / no img / hx of carpal tunnel surgery of left hand 1 year ago / referred to : [facility]    APPOINTMENT DATE: 4/23/25   NOTES STATUS DETAILS   OFFICE NOTE from referring provider Internal 12/3/24 - Hector Sidhu MD - Internal Med    MEDICATION LIST Internal

## 2025-04-23 ENCOUNTER — PRE VISIT (OUTPATIENT)
Dept: ORTHOPEDICS | Facility: CLINIC | Age: 60
End: 2025-04-23

## 2025-04-23 ENCOUNTER — OFFICE VISIT (OUTPATIENT)
Dept: ORTHOPEDICS | Facility: CLINIC | Age: 60
End: 2025-04-23
Attending: INTERNAL MEDICINE
Payer: COMMERCIAL

## 2025-04-23 ENCOUNTER — ANCILLARY PROCEDURE (OUTPATIENT)
Dept: GENERAL RADIOLOGY | Facility: CLINIC | Age: 60
End: 2025-04-23
Attending: ORTHOPAEDIC SURGERY
Payer: COMMERCIAL

## 2025-04-23 VITALS — BODY MASS INDEX: 44.1 KG/M2 | WEIGHT: 315 LBS | HEIGHT: 71 IN

## 2025-04-23 DIAGNOSIS — Z98.1 S/P CERVICAL SPINAL FUSION: ICD-10-CM

## 2025-04-23 DIAGNOSIS — G56.02 CARPAL TUNNEL SYNDROME OF LEFT WRIST: ICD-10-CM

## 2025-04-23 DIAGNOSIS — M79.642 LEFT HAND PAIN: ICD-10-CM

## 2025-04-23 DIAGNOSIS — M79.642 HAND PAIN, LEFT: ICD-10-CM

## 2025-04-23 PROCEDURE — 73130 X-RAY EXAM OF HAND: CPT | Mod: LT | Performed by: RADIOLOGY

## 2025-04-23 NOTE — PROGRESS NOTES
Orthopaedic Surgery Consultation  4/23/2025 10:08 AM   by Timmy Mayen MD    Dino Best MRN# [id number]   Age: 60 year old YOB: 1965     Reason for consult: Left upper extremity numbness                       Assessment and Plan:   Assessment:   Possible left ulnar neuropathy, compression      Plan:   Dino and I reviewed options.  I feel it would be reasonable to get a new electrical study to further evaluate particularly the ulnar nerve not only at the elbow but also at the wrist through Guyon's canal.  We talked about some postural modifications in his line of work as well.  I will plan on seeing him back after the EMG/NCV is completed              History of Present Illness:   60 year old male with left hand numbness and tingling, mostly small finger and ring finger and the middle finger.  He is status post a carpal tunnel release a year ago and had a previous carpal tunnel release on this left side he thinks about 7 years ago.  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  He had a rotator cuff repair on the left side a couple of years ago and he had a C4-6 ACD 1 year ago.  No recent trauma.  He states his hand is weak though.           Past Medical History:     Patient Active Problem List   Diagnosis    Primary localized osteoarthrosis, lower leg    Trigger finger, acquired    CAD (coronary artery disease)    Nasal obstruction    S/P cervical spinal fusion     Past Medical History:   Diagnosis Date    Arthritis of knee     CAD (coronary artery disease)     Diabetes (H)     HTN (hypertension)     Motion sickness     PONV (postoperative nausea and vomiting)     Sleep apnea     Uncomplicated asthma             Past Surgical History:   Relevant surgical history as mentioned in HPI.  Past Surgical History:   Procedure Laterality Date    COLONOSCOPY      DISCECTOMY, FUSION CERVICAL ANTERIOR THREE+ LEVELS, COMBINED N/A 3/8/2024    Procedure: CERVICAL 4 TO CERVICAL 7  ANTERIOR CERVICAL DISCECTOMY AND CERVICAL 4 TO CERVICAL 7 FUSION;  Surgeon: Mj Jara MD;  Location: [facility] OR    ENT SURGERY      ORTHOPEDIC SURGERY Left     rotator cuff    STENT, CORONARY, CR  07/01/2004            Social History:     Social History     Socioeconomic History    Marital status: Single     Spouse name: Not on file    Number of children: Not on file    Years of education: Not on file    Highest education level: Not on file   Occupational History    Not on file   Tobacco Use    Smoking status: Former     Types: Cigarettes    Smokeless tobacco: Never   Substance and Sexual Activity    Alcohol use: Yes     Comment: 8 drinks a week    Drug use: Yes     Types: Marijuana    Sexual activity: Not on file   Other Topics Concern    Not on file   Social History Narrative    Not on file     Social Drivers of Health     Financial Resource Strain: Low Risk  (12/3/2024)    Financial Resource Strain     Within the past 12 months, have you or your family members you live with been unable to get utilities (heat, electricity) when it was really needed?: No   Food Insecurity: Low Risk  (12/3/2024)    Food Insecurity     Within the past 12 months, did you worry that your food would run out before you got money to buy more?: No     Within the past 12 months, did the food you bought just not last and you didn t have money to get more?: No   Transportation Needs: Low Risk  (12/3/2024)    Transportation Needs     Within the past 12 months, has lack of transportation kept you from medical appointments, getting your medicines, non-medical meetings or appointments, work, or from getting things that you need?: No   Physical Activity: Not on file   Stress: Not on file   Social Connections: Not on file   Interpersonal Safety: Not At Risk (3/1/2024)    [facility]     Humiliation, Afraid, Rape, and Kick questionnaire     Fear of Current or Ex-Partner: No     Emotionally Abused: No      Physically Abused: No     Sexually Abused: No   Housing Stability: Low Risk  (12/3/2024)    Housing Stability     Do you have housing? : Yes     Are you worried about losing your housing?: No     Smoking, EtOH,        Family History:     Family History   Problem Relation Age of Onset    C.A.D. Father      No history of problems with bleeding or anesthesia       Allergies:     Allergies   Allergen Reactions    Atorvastatin Other (See Comments)     Hepatic dysfunction    Simvastatin Other (See Comments)     Hepatic dysfunction          Medications:     Current Outpatient Medications   Medication Sig Dispense Refill    acetaminophen (TYLENOL) 500 MG tablet Take 500-1,000 mg by mouth every 6 hours as needed for mild pain (Patient not taking: Reported on 12/3/2024)      aspirin 81 MG EC tablet Take 1 tablet (81 mg) by mouth daily      cetirizine (ZYRTEC) 10 MG tablet Take 10 mg by mouth daily as needed for allergies      ezetimibe (ZETIA) 10 MG tablet Take 1 tablet (10 mg) by mouth daily. 90 tablet 3    lisinopril (ZESTRIL) 20 MG tablet Take 1 tablet (20 mg) by mouth daily. 90 tablet 3    methocarbamol (ROBAXIN) 750 MG tablet Take 1 tablet (750 mg) by mouth every 6 hours as needed for muscle spasms (Patient not taking: Reported on 12/3/2024) 30 tablet 0    metoprolol succinate ER (TOPROL XL) 25 MG 24 hr tablet Take 1 tablet (25 mg) by mouth daily. 90 tablet 3    nitroGLYcerin (NITROSTAT) 0.4 MG sublingual tablet Place 0.4 mg under the tongue (Patient not taking: Reported on 12/3/2024)      oxyCODONE (ROXICODONE) 5 MG tablet Take 1 tablet (5 mg) by mouth every 4 hours as needed for moderate to severe pain (Patient not taking: Reported on 12/3/2024) 20 tablet 0    polyethylene glycol (MIRALAX) 17 GM/Dose powder Take 1 Capful by mouth daily as needed for constipation (Patient not taking: Reported on 12/3/2024)      rosuvastatin (CRESTOR) 20 MG tablet Take 1 tablet (20 mg) by mouth daily. 90 tablet 3    senna-docusate  "(SENOKOT-S/PERICOLACE) 8.6-50 MG tablet Take 1 tablet by mouth daily as needed for constipation (Patient not taking: Reported on 12/3/2024)      tadalafil (ADCIRCA/CIALIS) 20 MG tablet Take 1 tablet (20 mg) by mouth as needed. 30 tablet 5     No current facility-administered medications for this visit.             Review of Systems:   A comprehensive 10 point review of systems (constitutional, ENT, cardiac, peripheral vascular, lymphatic, respiratory, GI, , Musculoskeletal, skin, Neurological) was performed and found to be negative except as described in this note.           Physical Exam:     EXAMINATION pertinent findings:   VITAL SIGNS: Height 1.803 m (5' 11\"), weight (!) 143.8 kg (317 lb).  Body mass index is 44.21 kg/m .  RESP: non labored breathing   CARD: comfortable, no acute distress  ABD: benign   Examination left upper extremity demonstrates negative Spurling signs but he does have limited rotation and motion of the cervical spine.  Mild tenderness noted around the shoulder diffusely.  Tinel's is positive at the elbow.  He has full range of motion of the elbow wrist and small joints of the hand.  He does have evidence of psoriasis throughout the left upper extremity.  Tinel's is negative at the wrist, he has a well-healed carpal tunnel incision, no signs of infection.  Decreased sensation noted to light touch in the small finger ring finger and middle finger.  APB is 5 out of 5 as is FDI.  EDC 5 out of 5.  Froment's and Wartenberg's are negative.  He has brisk capillary refill and a palpable radial pulse.            Data:     All laboratory data reviewed  All imaging studies reviewed by me.  Pertinent Imaging and Lab Review:       Total Time = 20 min, 50% of which was spent in counseling and coordination of care as documented above.    Timmy Mayen MD  Orthopedic Surgery, Upper Extremity  Cell [phone]       "

## 2025-04-23 NOTE — NURSING NOTE
"Reason For Visit:   Chief Complaint   Patient presents with    Consult     Left hand pain and left wrist carpal tunnel       Primary MD: Hector Sidhu Joseph  Ref. MD: Hector Sidhu    Age: 60 year old    ?  No      Ht 1.803 m (5' 11\")   Wt (!) 143.8 kg (317 lb)   BMI 44.21 kg/m        Pain Assessment  Patient Currently in Pain: Yes  0-10 Pain Scale: 4  Primary Pain Location: Wrist (left wrist and hand)  Pain Descriptors: Intermittent, Aching, Dull, Constant, Numbness    Hand Dominance Evaluation  Hand Dominance: Right          QuickDASH Assessment      4/23/2025     9:04 AM   QuickDASH Main   1. Open a tight or new jar Moderate difficulty   2. Do heavy household chores (e.g., wash walls, floors) Moderate difficulty   3. Carry a shopping bag or briefcase Moderate difficulty   4. Wash your back Severe difficulty   5. Use a knife to cut food Mild difficulty   6. Recreational activities in which you take some force or impact through your arm, shoulder or hand (e.g., golf, hammering, tennis, etc.) Severe difficulty   7. During the past week, to what extent has your arm, shoulder or hand problem interfered with your normal social activities with family, friends, neighbours or groups Quite a bit   8. During the past week, were you limited in your work or other regular daily activities as a result of your arm, shoulder or hand problem Very limited   9. Arm, shoulder or hand pain Moderate   10.Tingling (pins and needles) in your arm,shoulder or hand Moderate   11. During the past week, how much difficulty have you had sleeping because of the pain in your arm, shoulder or hand Severe difficulty   Quickdash Ability Score 59.09          Current Outpatient Medications   Medication Sig Dispense Refill    acetaminophen (TYLENOL) 500 MG tablet Take 500-1,000 mg by mouth every 6 hours as needed for mild pain (Patient not taking: Reported on 12/3/2024)      aspirin 81 MG EC tablet Take 1 tablet (81 mg) by mouth " daily      cetirizine (ZYRTEC) 10 MG tablet Take 10 mg by mouth daily as needed for allergies      ezetimibe (ZETIA) 10 MG tablet Take 1 tablet (10 mg) by mouth daily. 90 tablet 3    lisinopril (ZESTRIL) 20 MG tablet Take 1 tablet (20 mg) by mouth daily. 90 tablet 3    methocarbamol (ROBAXIN) 750 MG tablet Take 1 tablet (750 mg) by mouth every 6 hours as needed for muscle spasms (Patient not taking: Reported on 12/3/2024) 30 tablet 0    metoprolol succinate ER (TOPROL XL) 25 MG 24 hr tablet Take 1 tablet (25 mg) by mouth daily. 90 tablet 3    nitroGLYcerin (NITROSTAT) 0.4 MG sublingual tablet Place 0.4 mg under the tongue (Patient not taking: Reported on 12/3/2024)      oxyCODONE (ROXICODONE) 5 MG tablet Take 1 tablet (5 mg) by mouth every 4 hours as needed for moderate to severe pain (Patient not taking: Reported on 12/3/2024) 20 tablet 0    polyethylene glycol (MIRALAX) 17 GM/Dose powder Take 1 Capful by mouth daily as needed for constipation (Patient not taking: Reported on 12/3/2024)      rosuvastatin (CRESTOR) 20 MG tablet Take 1 tablet (20 mg) by mouth daily. 90 tablet 3    senna-docusate (SENOKOT-S/PERICOLACE) 8.6-50 MG tablet Take 1 tablet by mouth daily as needed for constipation (Patient not taking: Reported on 12/3/2024)      tadalafil (ADCIRCA/CIALIS) 20 MG tablet Take 1 tablet (20 mg) by mouth as needed. 30 tablet 5       Allergies   Allergen Reactions    Atorvastatin Other (See Comments)     Hepatic dysfunction    Simvastatin Other (See Comments)     Hepatic dysfunction       ILDA BURRIS, ATC

## 2025-04-23 NOTE — LETTER
4/23/2025      Dino Best  [address]  [place] [address]      Dear Colleague,    Thank you for referring your patient, Dino Best, to the [facility] [place]. Please see a copy of my visit note below.    Orthopaedic Surgery Consultation  4/23/2025 10:08 AM   by Timmy Mayen MD    Dino Best MRN# [id number]   Age: 60 year old YOB: 1965     Reason for consult: Left upper extremity numbness                       Assessment and Plan:   Assessment:   Possible left ulnar neuropathy, compression      Plan:   Dino and I reviewed options.  I feel it would be reasonable to get a new electrical study to further evaluate particularly the ulnar nerve not only at the elbow but also at the wrist through Guyon's canal.  We talked about some postural modifications in his line of work as well.  I will plan on seeing him back after the EMG/NCV is completed              History of Present Illness:   60 year old male with left hand numbness and tingling, mostly small finger and ring finger and the middle finger.  He is status post a carpal tunnel release a year ago and had a previous carpal tunnel release on this left side he thinks about 7 years ago.  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  He had a rotator cuff repair on the left side a couple of years ago and he had a C4-6 ACD 1 year ago.  No recent trauma.  He states his hand is weak though.           Past Medical History:     Patient Active Problem List   Diagnosis     Primary localized osteoarthrosis, lower leg     Trigger finger, acquired     CAD (coronary artery disease)     Nasal obstruction     S/P cervical spinal fusion     Past Medical History:   Diagnosis Date     Arthritis of knee      CAD (coronary artery disease)      Diabetes (H)      HTN (hypertension)      Motion sickness      PONV (postoperative nausea and vomiting)      Sleep apnea      Uncomplicated asthma             Past  Surgical History:   Relevant surgical history as mentioned in HPI.  Past Surgical History:   Procedure Laterality Date     COLONOSCOPY       DISCECTOMY, FUSION CERVICAL ANTERIOR THREE+ LEVELS, COMBINED N/A 3/8/2024    Procedure: CERVICAL 4 TO CERVICAL 7 ANTERIOR CERVICAL DISCECTOMY AND CERVICAL 4 TO CERVICAL 7 FUSION;  Surgeon: Mj Jara MD;  Location: [facility] OR     ENT SURGERY       ORTHOPEDIC SURGERY Left     rotator cuff     STENT, CORONARY, CR  07/01/2004            Social History:     Social History     Socioeconomic History     Marital status: Single     Spouse name: Not on file     Number of children: Not on file     Years of education: Not on file     Highest education level: Not on file   Occupational History     Not on file   Tobacco Use     Smoking status: Former     Types: Cigarettes     Smokeless tobacco: Never   Substance and Sexual Activity     Alcohol use: Yes     Comment: 8 drinks a week     Drug use: Yes     Types: Marijuana     Sexual activity: Not on file   Other Topics Concern     Not on file   Social History Narrative     Not on file     Social Drivers of Health     Financial Resource Strain: Low Risk  (12/3/2024)    Financial Resource Strain      Within the past 12 months, have you or your family members you live with been unable to get utilities (heat, electricity) when it was really needed?: No   Food Insecurity: Low Risk  (12/3/2024)    Food Insecurity      Within the past 12 months, did you worry that your food would run out before you got money to buy more?: No      Within the past 12 months, did the food you bought just not last and you didn t have money to get more?: No   Transportation Needs: Low Risk  (12/3/2024)    Transportation Needs      Within the past 12 months, has lack of transportation kept you from medical appointments, getting your medicines, non-medical meetings or appointments, work, or from getting things that you need?: No   Physical Activity: Not on file   Stress: Not  on file   Social Connections: Not on file   Interpersonal Safety: Not At Risk (3/1/2024)    Received from [facility] , [facility]     Humiliation, Afraid, Rape, and Kick questionnaire      Fear of Current or Ex-Partner: No      Emotionally Abused: No      Physically Abused: No      Sexually Abused: No   Housing Stability: Low Risk  (12/3/2024)    Housing Stability      Do you have housing? : Yes      Are you worried about losing your housing?: No     Smoking, EtOH,        Family History:     Family History   Problem Relation Age of Onset     C.A.D. Father      No history of problems with bleeding or anesthesia       Allergies:     Allergies   Allergen Reactions     Atorvastatin Other (See Comments)     Hepatic dysfunction     Simvastatin Other (See Comments)     Hepatic dysfunction          Medications:     Current Outpatient Medications   Medication Sig Dispense Refill     acetaminophen (TYLENOL) 500 MG tablet Take 500-1,000 mg by mouth every 6 hours as needed for mild pain (Patient not taking: Reported on 12/3/2024)       aspirin 81 MG EC tablet Take 1 tablet (81 mg) by mouth daily       cetirizine (ZYRTEC) 10 MG tablet Take 10 mg by mouth daily as needed for allergies       ezetimibe (ZETIA) 10 MG tablet Take 1 tablet (10 mg) by mouth daily. 90 tablet 3     lisinopril (ZESTRIL) 20 MG tablet Take 1 tablet (20 mg) by mouth daily. 90 tablet 3     methocarbamol (ROBAXIN) 750 MG tablet Take 1 tablet (750 mg) by mouth every 6 hours as needed for muscle spasms (Patient not taking: Reported on 12/3/2024) 30 tablet 0     metoprolol succinate ER (TOPROL XL) 25 MG 24 hr tablet Take 1 tablet (25 mg) by mouth daily. 90 tablet 3     nitroGLYcerin (NITROSTAT) 0.4 MG sublingual tablet Place 0.4 mg under the tongue (Patient not taking: Reported on 12/3/2024)       oxyCODONE (ROXICODONE) 5 MG tablet Take 1 tablet (5 mg) by mouth every 4 hours as needed for moderate to severe pain (Patient not taking: Reported  "on 12/3/2024) 20 tablet 0     polyethylene glycol (MIRALAX) 17 GM/Dose powder Take 1 Capful by mouth daily as needed for constipation (Patient not taking: Reported on 12/3/2024)       rosuvastatin (CRESTOR) 20 MG tablet Take 1 tablet (20 mg) by mouth daily. 90 tablet 3     senna-docusate (SENOKOT-S/PERICOLACE) 8.6-50 MG tablet Take 1 tablet by mouth daily as needed for constipation (Patient not taking: Reported on 12/3/2024)       tadalafil (ADCIRCA/CIALIS) 20 MG tablet Take 1 tablet (20 mg) by mouth as needed. 30 tablet 5     No current facility-administered medications for this visit.             Review of Systems:   A comprehensive 10 point review of systems (constitutional, ENT, cardiac, peripheral vascular, lymphatic, respiratory, GI, , Musculoskeletal, skin, Neurological) was performed and found to be negative except as described in this note.           Physical Exam:     EXAMINATION pertinent findings:   VITAL SIGNS: Height 1.803 m (5' 11\"), weight (!) 143.8 kg (317 lb).  Body mass index is 44.21 kg/m .  RESP: non labored breathing   CARD: comfortable, no acute distress  ABD: benign   Examination left upper extremity demonstrates negative Spurling signs but he does have limited rotation and motion of the cervical spine.  Mild tenderness noted around the shoulder diffusely.  Tinel's is positive at the elbow.  He has full range of motion of the elbow wrist and small joints of the hand.  He does have evidence of psoriasis throughout the left upper extremity.  Tinel's is negative at the wrist, he has a well-healed carpal tunnel incision, no signs of infection.  Decreased sensation noted to light touch in the small finger ring finger and middle finger.  APB is 5 out of 5 as is FDI.  EDC 5 out of 5.  Froment's and Wartenberg's are negative.  He has brisk capillary refill and a palpable radial pulse.            Data:     All laboratory data reviewed  All imaging studies reviewed by me.  Pertinent Imaging and Lab " Review:       Total Time = 20 min, 50% of which was spent in counseling and coordination of care as documented above.    Timmy Mayen MD  Orthopedic Surgery, Upper Extremity  Cell [phone]   Again, thank you for allowing me to participate in the care of your patient.        Sincerely,        Timmy Mayen MD    Electronically signed

## 2025-04-25 ENCOUNTER — OFFICE VISIT (OUTPATIENT)
Dept: NEUROLOGY | Facility: CLINIC | Age: 60
End: 2025-04-25
Attending: ORTHOPAEDIC SURGERY
Payer: COMMERCIAL

## 2025-04-25 DIAGNOSIS — M54.12 CERVICAL RADICULOPATHY: ICD-10-CM

## 2025-04-25 DIAGNOSIS — M79.642 LEFT HAND PAIN: ICD-10-CM

## 2025-04-25 DIAGNOSIS — Z98.1 S/P CERVICAL SPINAL FUSION: ICD-10-CM

## 2025-04-25 DIAGNOSIS — G56.02 CARPAL TUNNEL SYNDROME OF LEFT WRIST: Primary | ICD-10-CM

## 2025-04-25 PROCEDURE — 95909 NRV CNDJ TST 5-6 STUDIES: CPT | Performed by: PHYSICAL MEDICINE & REHABILITATION

## 2025-04-25 PROCEDURE — 95886 MUSC TEST DONE W/N TEST COMP: CPT | Performed by: PHYSICAL MEDICINE & REHABILITATION

## 2025-04-25 NOTE — LETTER
2025       RE: Dino Best  [address]  [facility] [address]     Dear Colleague,    Thank you for referring your patient, Dino Best, to the [facility] [place] at [facility]. Please see a copy of my visit note below.                        [facility]  Electrodiagnostic Laboratory                 Department of Neurology                                                                                                         Test Date:  2025    Patient: Dino Best : 1965 Physician: Caro Christensen MD   Sex: Male AGE: 60 year Ref Phys: Timmy Mayen MD   ID#: [id number]   Technician: Mya Nair     History and Examination:  LUE, Pain, CTS      Techniques:  Motor conduction studies were done with surface recording electrodes. Sensory conduction studies were performed with surface electrodes, unless indicated otherwise by (n), designating the use of subdermal recording electrodes. Temperature was monitored and recorded throughout the study. Upper extremities were maintained at a temperature of 32 degrees Centigrade or higher and Lower extremities were maintained at a temperature of 31 degrees Centigrade or higher.  EMG was done with a concentric needle electrode.       Results  Evaluation of the left Median (APB) motor nerve showed prolonged distal onset latency (5.8 ms), reduced amplitude (Wrist, 3.3 mV), reduced amplitude (Elbow, 2.6 mV), and decreased conduction velocity (46 m/s).  The left median sensory nerve showed decreased conduction velocity (41 m/s).  The left Median-Ulnar Palmar sensory nerve showed abnormal peak latency difference ((Median Palm-Wrist)-(Ulnar Palm-Wrist), 1.25 ms). All remaining nerves (as indicated in the following tables) were within normal limits.      Needle evaluation of the left deltoid muscle showed increased Fibs/PSW, complex repetitive discharge HF, slightly increased  motor unit amplitude, slightly increased motor unit duration, and reduced recruitment.  The left Biceps Brachii muscle showed very increased Fibs/PSW, moderately increased motor unit amplitude, moderately increased motor unit duration, and moderately reduced recruitment.  The left Triceps Brachii muscle showed slightly increased motor unit amplitude and slightly increased motor unit duration.  The left Pronator Teres muscle showed slightly increased motor unit amplitude, moderately increased motor unit duration, and reduced recruitment.  All remaining muscles (as indicated in the following table) showed no evidence of electrical instability.          Interpretation:  Abnormal study.     --There is electrodiagnostic evidence of left C5, C6 and C7 radiculopathies, vs anterior myelopathy at the same levels. The denervation changes appreciated appear both recent/active as well as chronic.    --There is also electrodiagnostic evidence of a left median neuropathy at the wrist as seen in carpal tunnel syndrome, moderate severity.    --There is no electrodiagnostic evidence of ulnar mononeuropathy in the left upper extremity.      ___________________________  Caro Christensen MD        Nerve Conduction Studies  Motor Sites      Latency Neg. Amp Neg. Amp Diff Segment Distance Velocity Neg. Dur Neg Area Diff Temperature Comment   Site (ms) Norm (mV) Norm (%)  cm m/s Norm (ms) (%) ( C)    Left Median (APB) Motor   Wrist 5.8  < 4.4 3.3  > 5.0  Wrist-APB 7.5   5.3  32.1 moved G1   Elbow 10.6 - 2.6  > 5.0 -21 Elbow-Wrist 22.1 46  > 48 6.2 -15 32.2    Left Ulnar (ADM) Motor   Wrist 3.3  < 3.5 9.0  > 5.0  Wrist-ADM 8   5.2  32.3    Below Elbow 7.3 - 8.0 - -11 Below Elbow-Wrist 20.5 51  > 48 5.2 -10 32.4    Above Elbow 8.9 - 8.2 - 3 Above Elbow-Below Elbow 11.1 69  > 48 5.5 5 32.7    Left Ulnar (FDI) Motor   Wrist 4.5 - 10.8 -      3.7  33.1 16.2 cm   Below Elbow 8.1 - 9.7 - -10 Below Elbow-Wrist 21.5 60  > 48 3.7 -11 33.1    Above  Elbow 10.1 - 9.3 - -4 Above Elbow-Below Elbow 11.1 56  > 48 3.6 -9 33.1      Sensory Sites      Onset Lat Peak Lat Amp (O-P) Amp (P-P) Segment Distance Velocity Temperature Comment   Site ms (ms)  V Norm ( V)  cm m/s Norm ( C)    Left Median Sensory   Wrist-Dig II 3.4 4.6 12  > 10 20 Wrist-Dig II 14 41  > 48 33    Left Median-Ulnar Palmar Sensory        Median   Palm-Wrist 2.5 3.1 8 - 14 Palm-Wrist 8 32 - 33.5         Ulnar   Palm-Wrist 1.45 1.85 3 - 8 Palm-Wrist 8 55 - 33.6    Left Ulnar Sensory   Wrist-Dig V 2.4 3.1 8  > 8 11 Wrist-Dig V 12.5 52  > 48 33.2      Inter-Nerve Comparisons     Nerve 1 Value 1 Nerve 2 Value 2 Parameter Result Normal   Sensory Sites   L Median Palm-Wrist 3.1 ms L Ulnar Palm-Wrist 1.85 ms Peak Lat Diff 1.25 ms <0.40       Electromyography     Side Muscle Ins Act Fibs/PSW Fasc HF Amp Dur Poly Recrt Int Pat   Left Deltoid Nml 1+ Nml CRD 1+ 1+ 0 Rissa Nml   Left Biceps Nml 3+ Nml 0 2+ 2+ 0 ModRed Nml   Left Triceps Nml None Nml 0 1+ 1+ 0 Nml Nml   Left Pronator Teres Nml None Nml 0 1+ 2+ 0 Rissa Nml   Left FDI Nml None Nml 0 Nml Nml 0 Nml Nml   Left APB Nml None Nml 0 Nml Nml 0 Nml Nml         Waveforms / Images:    Motor           Sensory                     Again, thank you for allowing me to participate in the care of your patient.      Sincerely,    Caro Christensen MD

## 2025-04-25 NOTE — PROGRESS NOTES
[facility]  Electrodiagnostic Laboratory                 Department of Neurology                                                                                                         Test Date:  2025    Patient: Dino Best : 1965 Physician: Caro Christensen MD   Sex: Male AGE: 60 year Ref Phys: Timmy Mayen MD   ID#: [id number]   Technician: Mya Nair     History and Examination:  LUE, Pain, CTS      Techniques:  Motor conduction studies were done with surface recording electrodes. Sensory conduction studies were performed with surface electrodes, unless indicated otherwise by (n), designating the use of subdermal recording electrodes. Temperature was monitored and recorded throughout the study. Upper extremities were maintained at a temperature of 32 degrees Centigrade or higher and Lower extremities were maintained at a temperature of 31 degrees Centigrade or higher.  EMG was done with a concentric needle electrode.       Results  Evaluation of the left Median (APB) motor nerve showed prolonged distal onset latency (5.8 ms), reduced amplitude (Wrist, 3.3 mV), reduced amplitude (Elbow, 2.6 mV), and decreased conduction velocity (46 m/s).  The left median sensory nerve showed decreased conduction velocity (41 m/s).  The left Median-Ulnar Palmar sensory nerve showed abnormal peak latency difference ((Median Palm-Wrist)-(Ulnar Palm-Wrist), 1.25 ms). All remaining nerves (as indicated in the following tables) were within normal limits.      Needle evaluation of the left deltoid muscle showed increased Fibs/PSW, complex repetitive discharge HF, slightly increased motor unit amplitude, slightly increased motor unit duration, and reduced recruitment.  The left Biceps Brachii muscle showed very increased Fibs/PSW, moderately increased motor unit amplitude, moderately increased motor unit duration, and moderately reduced recruitment.  The left Triceps Brachii muscle  showed slightly increased motor unit amplitude and slightly increased motor unit duration.  The left Pronator Teres muscle showed slightly increased motor unit amplitude, moderately increased motor unit duration, and reduced recruitment.  All remaining muscles (as indicated in the following table) showed no evidence of electrical instability.          Interpretation:  Abnormal study.     --There is electrodiagnostic evidence of left C5, C6 and C7 radiculopathies, vs anterior myelopathy at the same levels. The denervation changes appreciated appear both recent/active as well as chronic.    --There is also electrodiagnostic evidence of a left median neuropathy at the wrist as seen in carpal tunnel syndrome, moderate severity.    --There is no electrodiagnostic evidence of ulnar mononeuropathy in the left upper extremity.      ___________________________  Caro Christensen MD        Nerve Conduction Studies  Motor Sites      Latency Neg. Amp Neg. Amp Diff Segment Distance Velocity Neg. Dur Neg Area Diff Temperature Comment   Site (ms) Norm (mV) Norm (%)  cm m/s Norm (ms) (%) ( C)    Left Median (APB) Motor   Wrist 5.8  < 4.4 3.3  > 5.0  Wrist-APB 7.5   5.3  32.1 moved G1   Elbow 10.6 - 2.6  > 5.0 -21 Elbow-Wrist 22.1 46  > 48 6.2 -15 32.2    Left Ulnar (ADM) Motor   Wrist 3.3  < 3.5 9.0  > 5.0  Wrist-ADM 8   5.2  32.3    Below Elbow 7.3 - 8.0 - -11 Below Elbow-Wrist 20.5 51  > 48 5.2 -10 32.4    Above Elbow 8.9 - 8.2 - 3 Above Elbow-Below Elbow 11.1 69  > 48 5.5 5 32.7    Left Ulnar (FDI) Motor   Wrist 4.5 - 10.8 -      3.7  33.1 16.2 cm   Below Elbow 8.1 - 9.7 - -10 Below Elbow-Wrist 21.5 60  > 48 3.7 -11 33.1    Above Elbow 10.1 - 9.3 - -4 Above Elbow-Below Elbow 11.1 56  > 48 3.6 -9 33.1      Sensory Sites      Onset Lat Peak Lat Amp (O-P) Amp (P-P) Segment Distance Velocity Temperature Comment   Site ms (ms)  V Norm ( V)  cm m/s Norm ( C)    Left Median Sensory   Wrist-Dig II 3.4 4.6 12  > 10 20 Wrist-Dig II 14 41  >  48 33    Left Median-Ulnar Palmar Sensory        Median   Palm-Wrist 2.5 3.1 8 - 14 Palm-Wrist 8 32 - 33.5         Ulnar   Palm-Wrist 1.45 1.85 3 - 8 Palm-Wrist 8 55 - 33.6    Left Ulnar Sensory   Wrist-Dig V 2.4 3.1 8  > 8 11 Wrist-Dig V 12.5 52  > 48 33.2      Inter-Nerve Comparisons     Nerve 1 Value 1 Nerve 2 Value 2 Parameter Result Normal   Sensory Sites   L Median Palm-Wrist 3.1 ms L Ulnar Palm-Wrist 1.85 ms Peak Lat Diff 1.25 ms <0.40       Electromyography     Side Muscle Ins Act Fibs/PSW Fasc HF Amp Dur Poly Recrt Int Pat   Left Deltoid Nml 1+ Nml CRD 1+ 1+ 0 Rissa Nml   Left Biceps Nml 3+ Nml 0 2+ 2+ 0 ModRed Nml   Left Triceps Nml None Nml 0 1+ 1+ 0 Nml Nml   Left Pronator Teres Nml None Nml 0 1+ 2+ 0 Rissa Nml   Left FDI Nml None Nml 0 Nml Nml 0 Nml Nml   Left APB Nml None Nml 0 Nml Nml 0 Nml Nml         Waveforms / Images:    Motor           Sensory

## 2025-05-14 ENCOUNTER — OFFICE VISIT (OUTPATIENT)
Dept: ORTHOPEDICS | Facility: CLINIC | Age: 60
End: 2025-05-14
Payer: COMMERCIAL

## 2025-05-14 DIAGNOSIS — M79.642 LEFT HAND PAIN: ICD-10-CM

## 2025-05-14 DIAGNOSIS — Z98.1 S/P CERVICAL SPINAL FUSION: Primary | ICD-10-CM

## 2025-05-14 PROCEDURE — 99213 OFFICE O/P EST LOW 20 MIN: CPT | Performed by: ORTHOPAEDIC SURGERY

## 2025-05-14 NOTE — PROGRESS NOTES
Dino is here for follow-up of his left upper extremity.  We reviewed the electrical study.  The electrical study demonstrated evidence of left C5-C6 and C7 radiculopathies versus an anterior myelopathy at the same levels.  He does note neck pain.  No evidence of ulnar mononeuropathy in the left upper extremity was noted.  He is about a year out now from multilevel cervical fusion.  He thinks he is about the same as he was preop.  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  He did have a carpal tunnel release many years ago.  He is complaining of numbness and tingling at the base of his small finger and ring finger as well as in the thenar eminence.  The pain radiates from his neck towards the elbow and hand.  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    The past medical history was reviewed and documented in the chart.  This includes medications, surgeries, social history as well as review of systems.    Physical examination demonstrates limited range of motion of the cervical spine.  APB is 5 out of 5, FDI is 5 out of 5.  Decreased sensation noted in the palm but intact in the small finger, thumb and dorsal hand in the radial sensory nerve distribution.  He has a palpable radial pulse, brisk capillary refill noted.  No atrophy noted in the hand.  No overlying skin changes.    Impression:  Cervical radiculopathy left upper extremity, status post multilevel cervical fusion  Status post left carpal tunnel release      Plan:  We will plan on getting him a Heelbo pad as when he is in the excavator at work the elbow is fairly flexed and I think at least padding the elbow might give him some relief here.  No surgical indications currently for the elbow or wrist or hand.  I do think a second opinion with Dr. Bartlett would be helpful to further evaluate his cervical spine.  That can be arranged.  We had a discussion about all this this  morning, his questions were answered.  I will plan on seeing him back on an as-needed basis.  He does have my contact information should the need arise.

## 2025-05-14 NOTE — NURSING NOTE
Reason For Visit:   Chief Complaint   Patient presents with    RECHECK     Numbness of left upper extremity        Primary MD: Hector Sidhu  Ref. MD: Sanya    Age: 60 year old    ?  No      There were no vitals taken for this visit.      Pain Assessment  Primary Pain Location: Arm (Left arm/hand)  Pain Descriptors: Tingling, Numbness, Constant    Hand Dominance Evaluation  Hand Dominance: Right          QuickDASH Assessment      5/14/2025     7:56 AM   QuickDASH Main   1. Open a tight or new jar Moderate difficulty   2. Do heavy household chores (e.g., wash walls, floors) Moderate difficulty   3. Carry a shopping bag or briefcase Moderate difficulty   4. Wash your back Severe difficulty   5. Use a knife to cut food Mild difficulty   6. Recreational activities in which you take some force or impact through your arm, shoulder or hand (e.g., golf, hammering, tennis, etc.) Moderate difficulty   7. During the past week, to what extent has your arm, shoulder or hand problem interfered with your normal social activities with family, friends, neighbours or groups Quite a bit   8. During the past week, were you limited in your work or other regular daily activities as a result of your arm, shoulder or hand problem Moderately limited   9. Arm, shoulder or hand pain Moderate   10.Tingling (pins and needles) in your arm,shoulder or hand Severe   11. During the past week, how much difficulty have you had sleeping because of the pain in your arm, shoulder or hand Severe difficulty   Quickdash Ability Score 56.82          Current Outpatient Medications   Medication Sig Dispense Refill    acetaminophen (TYLENOL) 500 MG tablet Take 500-1,000 mg by mouth every 6 hours as needed for mild pain (Patient not taking: Reported on 12/3/2024)      aspirin 81 MG EC tablet Take 1 tablet (81 mg) by mouth daily      cetirizine (ZYRTEC) 10 MG tablet Take 10 mg by mouth daily as needed for allergies      ezetimibe (ZETIA) 10  MG tablet Take 1 tablet (10 mg) by mouth daily. 90 tablet 3    lisinopril (ZESTRIL) 20 MG tablet Take 1 tablet (20 mg) by mouth daily. 90 tablet 3    methocarbamol (ROBAXIN) 750 MG tablet Take 1 tablet (750 mg) by mouth every 6 hours as needed for muscle spasms (Patient not taking: Reported on 12/3/2024) 30 tablet 0    metoprolol succinate ER (TOPROL XL) 25 MG 24 hr tablet Take 1 tablet (25 mg) by mouth daily. 90 tablet 3    nitroGLYcerin (NITROSTAT) 0.4 MG sublingual tablet Place 0.4 mg under the tongue (Patient not taking: Reported on 12/3/2024)      oxyCODONE (ROXICODONE) 5 MG tablet Take 1 tablet (5 mg) by mouth every 4 hours as needed for moderate to severe pain (Patient not taking: Reported on 12/3/2024) 20 tablet 0    polyethylene glycol (MIRALAX) 17 GM/Dose powder Take 1 Capful by mouth daily as needed for constipation (Patient not taking: Reported on 12/3/2024)      rosuvastatin (CRESTOR) 20 MG tablet Take 1 tablet (20 mg) by mouth daily. 90 tablet 3    senna-docusate (SENOKOT-S/PERICOLACE) 8.6-50 MG tablet Take 1 tablet by mouth daily as needed for constipation (Patient not taking: Reported on 12/3/2024)      tadalafil (ADCIRCA/CIALIS) 20 MG tablet Take 1 tablet (20 mg) by mouth as needed. 30 tablet 5       Allergies   Allergen Reactions    Atorvastatin Other (See Comments)     Hepatic dysfunction    Simvastatin Other (See Comments)     Hepatic dysfunction       Valentina Coles, ATC

## 2025-05-14 NOTE — LETTER
5/14/2025      Dino Best  [address]  [place] [address]      Dear Colleague,    Thank you for referring your patient, Dino Best, to the [facility] [place]. Please see a copy of my visit note below.    Dino is here for follow-up of his left upper extremity.  We reviewed the electrical study.  The electrical study demonstrated evidence of left C5-C6 and C7 radiculopathies versus an anterior myelopathy at the same levels.  He does note neck pain.  No evidence of ulnar mononeuropathy in the left upper extremity was noted.  He is about a year out now from multilevel cervical fusion.  He thinks he is about the same as he was preop.  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  He did have a carpal tunnel release many years ago.  He is complaining of numbness and tingling at the base of his small finger and ring finger as well as in the thenar eminence.  The pain radiates from his neck towards the elbow and hand.  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    The past medical history was reviewed and documented in the chart.  This includes medications, surgeries, social history as well as review of systems.    Physical examination demonstrates limited range of motion of the cervical spine.  APB is 5 out of 5, FDI is 5 out of 5.  Decreased sensation noted in the palm but intact in the small finger, thumb and dorsal hand in the radial sensory nerve distribution.  He has a palpable radial pulse, brisk capillary refill noted.  No atrophy noted in the hand.  No overlying skin changes.    Impression:  Cervical radiculopathy left upper extremity, status post multilevel cervical fusion  Status post left carpal tunnel release      Plan:  We will plan on getting him a Heelbo pad as when he is in the excavator at work the elbow is fairly flexed and I think at least padding the elbow might give him some  relief here.  No surgical indications currently for the elbow or wrist or hand.  I do think a second opinion with Dr. Bartlett would be helpful to further evaluate his cervical spine.  That can be arranged.  We had a discussion about all this this morning, his questions were answered.  I will plan on seeing him back on an as-needed basis.  He does have my contact information should the need arise.      Again, thank you for allowing me to participate in the care of your patient.        Sincerely,        Timmy Mayen MD    Electronically signed

## 2025-06-06 NOTE — TELEPHONE ENCOUNTER
DIAGNOSIS:   S/P cervical spinal fusion [Z98.1]  - Primary  Left hand pain [M79.642]   APPOINTMENT DATE: 07/15/2025   NOTES STATUS DETAILS   OFFICE NOTE from referring provider Internal 05/14/2025 - Timmy Mayen MD - [facility]   OFFICE NOTE from other specialist In process [facility]   OPERATIVE REPORT Internal 03/08/2024 - CERVICAL 4 TO CERVICAL 7 ANTERIOR CERVICAL DISCECTOMY AND CERVICAL 4 TO CERVICAL 7 FUSION.     05/04/2023 - LEFT SHOULDER ARTHROSCOPIC ROTATOR CUFF REPAIR, SUBACROMIAL DECOMPRESSION, BICEPS TENOTOMY.   EMG (for Spine) In process    INJECTIONS DONE IN RADIOLOGY In process    MRI In process [facility]:  05/12/2017 - L Spine   CT SCAN In process [facility]:  03/14/2023 - C Spine  03/14/2023 - T Spine  03/14/2023 - L Spine   XRAYS (IMAGES & REPORTS) In process Internal:  03/09/2024 - C Spine  03/08/2024 - Surgery C-ARM    HP:  11/03/2017 - Neck Soft Tissue

## 2025-06-16 NOTE — TELEPHONE ENCOUNTER
RECORDS RECEIVED FROM:    DATE RECEIVED:    NOTES STATUS DETAILS   OFFICE NOTE from referring provider  Internal Dr. Sidhu 12/3/24   OFFICE NOTE from other cardiologists  Care Everywhere 8/15/24 Dr. Ho [facility]   RECORDS from hospital/ED N/A    MEDICATION LIST Internal    GENERAL CARDIO RECORDS   (ALL APPOINTMENT TYPES)     LABS (CBC,BMP,CMP, TSH) Internal    EKG (STRIPS & REPORTS) In process 8/16/24   MONITORS (STRIPS & REPORTS) N/A    ECHOS (IMAGES AND REPORTS) N/A    STRESS TESTS (IMAGES AND REPORTS) N/A    cMRI (IMAGES AND REPORTS) N/A    Cardiac cath (IMAGES AND REPORTS) N/A    CT/CTA (IMAGES AND REPORTS) N/A      Action 6/16/25 [facility]  Fax #[phone]   Action Taken Requested EKG 8/16/24    Sent EKG to scanning 6/17

## 2025-06-26 ENCOUNTER — TELEPHONE (OUTPATIENT)
Dept: CARDIOLOGY | Facility: CLINIC | Age: 60
End: 2025-06-26
Payer: COMMERCIAL

## 2025-06-26 NOTE — TELEPHONE ENCOUNTER
Patient Contacted for the patient to call back and schedule the following:    Appointment type: Fasting labs  Provider: Dr. Butler  Return date: 6/28/2025  Specialty phone number: [phone] Opt 1  Additional appointment(s) needed: NA  Additonal Notes: lipid labs prior to Dr. Butler appt

## 2025-06-27 ENCOUNTER — DOCUMENTATION ONLY (OUTPATIENT)
Dept: CARDIOLOGY | Facility: CLINIC | Age: 60
End: 2025-06-27
Payer: COMMERCIAL

## 2025-06-28 ENCOUNTER — LAB (OUTPATIENT)
Dept: LAB | Facility: CLINIC | Age: 60
End: 2025-06-28
Attending: INTERNAL MEDICINE
Payer: COMMERCIAL

## 2025-06-28 ENCOUNTER — PRE VISIT (OUTPATIENT)
Dept: CARDIOLOGY | Facility: CLINIC | Age: 60
End: 2025-06-28
Payer: COMMERCIAL

## 2025-06-28 ENCOUNTER — OFFICE VISIT (OUTPATIENT)
Dept: CARDIOLOGY | Facility: CLINIC | Age: 60
End: 2025-06-28
Attending: INTERNAL MEDICINE
Payer: COMMERCIAL

## 2025-06-28 VITALS
HEART RATE: 62 BPM | BODY MASS INDEX: 42.3 KG/M2 | OXYGEN SATURATION: 98 % | DIASTOLIC BLOOD PRESSURE: 82 MMHG | WEIGHT: 303.3 LBS | SYSTOLIC BLOOD PRESSURE: 121 MMHG

## 2025-06-28 DIAGNOSIS — E78.5 HYPERLIPIDEMIA, UNSPECIFIED HYPERLIPIDEMIA TYPE: ICD-10-CM

## 2025-06-28 DIAGNOSIS — I25.119 CORONARY ARTERY DISEASE INVOLVING NATIVE CORONARY ARTERY OF NATIVE HEART WITH ANGINA PECTORIS: Primary | ICD-10-CM

## 2025-06-28 DIAGNOSIS — I10 BENIGN ESSENTIAL HYPERTENSION: ICD-10-CM

## 2025-06-28 DIAGNOSIS — I71.21 ANEURYSM OF ASCENDING AORTA WITHOUT RUPTURE: ICD-10-CM

## 2025-06-28 DIAGNOSIS — G47.33 OSA (OBSTRUCTIVE SLEEP APNEA): ICD-10-CM

## 2025-06-28 LAB
CHOLEST SERPL-MCNC: 189 MG/DL
FASTING STATUS PATIENT QL REPORTED: ABNORMAL
HDLC SERPL-MCNC: 36 MG/DL
LDLC SERPL CALC-MCNC: 118 MG/DL
NONHDLC SERPL-MCNC: 153 MG/DL
TRIGL SERPL-MCNC: 174 MG/DL

## 2025-06-28 PROCEDURE — 3079F DIAST BP 80-89 MM HG: CPT | Performed by: INTERNAL MEDICINE

## 2025-06-28 PROCEDURE — 80061 LIPID PANEL: CPT | Performed by: PATHOLOGY

## 2025-06-28 PROCEDURE — 99204 OFFICE O/P NEW MOD 45 MIN: CPT | Performed by: INTERNAL MEDICINE

## 2025-06-28 PROCEDURE — 99213 OFFICE O/P EST LOW 20 MIN: CPT | Performed by: INTERNAL MEDICINE

## 2025-06-28 PROCEDURE — 3074F SYST BP LT 130 MM HG: CPT | Performed by: INTERNAL MEDICINE

## 2025-06-28 PROCEDURE — 1126F AMNT PAIN NOTED NONE PRSNT: CPT | Performed by: INTERNAL MEDICINE

## 2025-06-28 PROCEDURE — 36415 COLL VENOUS BLD VENIPUNCTURE: CPT | Performed by: PATHOLOGY

## 2025-06-28 RX ORDER — LISINOPRIL 20 MG/1
20 TABLET ORAL DAILY
Qty: 90 TABLET | Refills: 3 | Status: SHIPPED | OUTPATIENT
Start: 2025-06-28

## 2025-06-28 RX ORDER — METOPROLOL SUCCINATE 25 MG/1
25 TABLET, EXTENDED RELEASE ORAL DAILY
Qty: 90 TABLET | Refills: 3 | Status: SHIPPED | OUTPATIENT
Start: 2025-06-28

## 2025-06-28 RX ORDER — EZETIMIBE 10 MG/1
10 TABLET ORAL DAILY
Qty: 90 TABLET | Refills: 3 | Status: SHIPPED | OUTPATIENT
Start: 2025-06-28

## 2025-06-28 RX ORDER — ROSUVASTATIN CALCIUM 40 MG/1
40 TABLET, COATED ORAL DAILY
Qty: 90 TABLET | Refills: 3 | Status: SHIPPED | OUTPATIENT
Start: 2025-06-28

## 2025-06-28 RX ORDER — NITROGLYCERIN 0.4 MG/1
0.4 TABLET SUBLINGUAL EVERY 5 MIN PRN
Qty: 15 TABLET | Refills: 3 | Status: SHIPPED | OUTPATIENT
Start: 2025-06-28

## 2025-06-28 ASSESSMENT — PAIN SCALES - GENERAL: PAINLEVEL_OUTOF10: NO PAIN (0)

## 2025-06-28 NOTE — PROGRESS NOTES
CARDIOLOGY CONSULTATION:    Mr. Best is a 60 year old male with a past medical history significant for CAD (stenting to the RCA 2001, 2004, 2005 ), HTN, Hyperlipidemia, BMI 44, KAMAR uses CPAP.    His last cholesterol 8/2024 showed , HDL 40.  He says his crestor was increased to 40 mg but he has gotten refills sometimes and they only gave him 20 mg.  He is on zetia 10 mg daily as well.    A1C 2/2024 6.3.  Last echo 5/2021 with normal EF and no RWMA. AO 4.2 CM with no valve disease. Has had CTA of the chest where aorta has measured 4.0 CM, most recently in 2016.     Denies cardiac symptoms other than SOB. With his heart attacks, he had pain in his left shoulder and SOB.  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  He drinks 5 drinks a night some nights, which he knows has contributed with his weight gain (lost 25 lbs not drinking last year).  He does use the CPAP but thinks he needs a new one.  He is interested in starting an exercise program and losing weight.  No family history of early CAD (younger and older brother).  Forgot to take meds yesterday and has not taken them yet this AM, so BP up a bit. Needs refills on meds.       PAST MEDICAL HISTORY:  Past Medical History:   Diagnosis Date    Arthritis of knee     CAD (coronary artery disease)     Diabetes (H)     HTN (hypertension)     Motion sickness     PONV (postoperative nausea and vomiting)     Sleep apnea     Uncomplicated asthma        CURRENT MEDICATIONS:  Current Outpatient Medications   Medication Sig Dispense Refill    aspirin 81 MG EC tablet Take 1 tablet (81 mg) by mouth daily      ezetimibe (ZETIA) 10 MG tablet Take 1 tablet (10 mg) by mouth daily. 90 tablet 3    lisinopril (ZESTRIL) 20 MG tablet Take 1 tablet (20 mg) by mouth daily. 90 tablet 3    metoprolol succinate ER (TOPROL XL) 25 MG 24 hr tablet Take 1 tablet (25 mg) by mouth daily. 90 tablet 3    rosuvastatin (CRESTOR) 20 MG tablet Take 1 tablet (20 mg) by mouth daily. 90  tablet 3    tadalafil (ADCIRCA/CIALIS) 20 MG tablet Take 1 tablet (20 mg) by mouth as needed. 30 tablet 5    acetaminophen (TYLENOL) 500 MG tablet Take 500-1,000 mg by mouth every 6 hours as needed for mild pain (Patient not taking: Reported on 6/28/2025)      cetirizine (ZYRTEC) 10 MG tablet Take 10 mg by mouth daily as needed for allergies (Patient not taking: Reported on 6/28/2025)      methocarbamol (ROBAXIN) 750 MG tablet Take 1 tablet (750 mg) by mouth every 6 hours as needed for muscle spasms (Patient not taking: Reported on 6/28/2025) 30 tablet 0    nitroGLYcerin (NITROSTAT) 0.4 MG sublingual tablet Place 0.4 mg under the tongue (Patient not taking: Reported on 6/28/2025)      oxyCODONE (ROXICODONE) 5 MG tablet Take 1 tablet (5 mg) by mouth every 4 hours as needed for moderate to severe pain (Patient not taking: Reported on 6/28/2025) 20 tablet 0    polyethylene glycol (MIRALAX) 17 GM/Dose powder Take 1 Capful by mouth daily as needed for constipation (Patient not taking: Reported on 6/28/2025)      senna-docusate (SENOKOT-S/PERICOLACE) 8.6-50 MG tablet Take 1 tablet by mouth daily as needed for constipation (Patient not taking: Reported on 6/28/2025)         PAST SURGICAL HISTORY:  Past Surgical History:   Procedure Laterality Date    COLONOSCOPY      DISCECTOMY, FUSION CERVICAL ANTERIOR THREE+ LEVELS, COMBINED N/A 3/8/2024    Procedure: CERVICAL 4 TO CERVICAL 7 ANTERIOR CERVICAL DISCECTOMY AND CERVICAL 4 TO CERVICAL 7 FUSION;  Surgeon: Mj Jara MD;  Location: [facility] OR    ENT SURGERY      ORTHOPEDIC SURGERY Left     rotator cuff    STENT, CORONARY, CR  07/01/2004       ALLERGIES  Atorvastatin and Simvastatin    FAMILY HX:  Family History   Problem Relation Age of Onset    C.A.D. Father        SOCIAL HX:  Social History     Socioeconomic History    Marital status: Single   Tobacco Use    Smoking status: Former     Types: Cigarettes    Smokeless tobacco: Never   Substance and Sexual Activity    Alcohol use:  Yes     Comment: 8 drinks a week    Drug use: Yes     Types: Marijuana     Social Drivers of Health     Financial Resource Strain: Low Risk  (12/3/2024)    Financial Resource Strain     Within the past 12 months, have you or your family members you live with been unable to get utilities (heat, electricity) when it was really needed?: No   Food Insecurity: Low Risk  (12/3/2024)    Food Insecurity     Within the past 12 months, did you worry that your food would run out before you got money to buy more?: No     Within the past 12 months, did the food you bought just not last and you didn t have money to get more?: No   Transportation Needs: Low Risk  (12/3/2024)    Transportation Needs     Within the past 12 months, has lack of transportation kept you from medical appointments, getting your medicines, non-medical meetings or appointments, work, or from getting things that you need?: No   Interpersonal Safety: Not At Risk (3/1/2024)    Received from [employer]     Humiliation, Afraid, Rape, and Kick questionnaire     Fear of Current or Ex-Partner: No     Emotionally Abused: No     Physically Abused: No     Sexually Abused: No   Housing Stability: Low Risk  (12/3/2024)    Housing Stability     Do you have housing? : Yes     Are you worried about losing your housing?: No       ROS:  Constitutional: No fever, chills, or sweats. No weight gain/loss.   ENT: No visual disturbance, ear ache, epistaxis, sore throat.   Allergies/Immunologic: Negative.   Respiratory: No cough, hemoptysis.   Cardiovascular: As per HPI.   GI: No nausea, vomiting, hematemesis, melena, or hematochezia.   : No urinary frequency, dysuria, or hematuria.   Integument: Negative.   Psychiatric: Negative.   Neuro: Negative.   Endocrinology: Negative.   Musculoskeletal: No myalgia.    VITAL SIGNS:  /82   Pulse 62   Wt (!) 137.6 kg (303 lb 4.8 oz)   SpO2 98%   BMI 42.30 kg/m    Body mass index is 42.3 kg/m .  Wt Readings from Last 2  "Encounters:   06/28/25 (!) 137.6 kg (303 lb 4.8 oz)   04/23/25 (!) 143.8 kg (317 lb)       PHYSICAL EXAM  Dino Best IS A 60 year old male.in no acute distress.  HEENT: Unremarkable.  Neck: JVP normal.    Lungs: CTA.  Cor: RRR. Normal S1 and S2.  No murmur, rub, or gallop.  PMI in Lf 5th ICS.  Abd: Soft  Extremities: No C/C/E. .  Neuro: Grossly intact.    LABS    No results found for: \"WBC\"  No results found for: \"RBC\"  Lab Results   Component Value Date    HGB 15.2 03/09/2024       Recent Labs   Lab Test 03/09/24  0742 03/08/24  1202 03/08/24  0556     --   --    POTASSIUM 4.5  --  4.5   CHLORIDE 105  --   --    CO2 23  --   --    ANIONGAP 12  --   --    * 157* 137*   BUN 12.3  --   --    CR 0.89  --  1.04   ARTURO 9.1  --   --        ASSESSMENT AND PLAN:  #1 CAD (stenting to the RCA 2001, 2004, 2005 )  #2 HTN  #3 Hyperlipidemia  #4 BMI 44  #5 KAMAR.  #6 AO 4.2 CM  #7 ETOH dependence  #8 SOB  #9 Pre-diabetes     It was a pleasure to be involved in the care of .  Reviewed his history and symptoms in detail and testing as outlined.  He is having some symptoms of shortness of breath, which is likely multifactorial, but it was one of his symptoms with his prior cardiac events, so we will proceed with stress testing before starting an exercise regimen.  We discussed doing a cardiac MRI and MRA with stress, so that we will also be able to evaluate his aortic dimensions.      He reports his blood pressure is usually under control and he only forgets his medicines about once per week.  He needs refills which were provided today.    He is interested in losing weight, which is important of course for his overall health.  Will refer him to weight management clinic as well as our [facility] pharmacy team and hopefully get approval for medications to help him on his weight loss journey.  He knows he needs to limit ETOH intake.    He feels he needs a new CPAP, mask so referred him to sleep medicine.    Follow-up will " be scheduled in the general cardiology clinic after testing.    He understands and is in agreement with the plan.  It was a pleasure to be involved in his care. Please do not hesitate to contact me with questions.     PHILIP Butler MD   34 minutes Face-face, documentation and review of records on day of visit

## 2025-06-28 NOTE — PATIENT INSTRUCTIONS
Medication Changes & Instructions:  Refills on your medications    Follow up Appointment Information:  -Schedule for MRI/MRA- to review your aortic aneurysm and coronary artery disease- schedulers will call you  - follow-up with cardiology after testing  - Referral for sleep medicine-  If you don't hear from a representative within 2 business days, please call [phone]   - Referral to weight management-  If you don t hear from a representative within 2 business days, please call [phone]   - Pharmacy referral- to go over your medications to make sure you understand how to take them. [phone]       Thank you for allowing us to be a part of your care here at the [facility].      If you have questions or concerns please contact us at:  Cardiovascular Clinic  [facility]   [phone] Press #1 to send a message to your care team  On Call Cardiologist for after hours or on weekends: [phone]  option #4

## 2025-06-28 NOTE — NURSING NOTE
"Patient educated to the following during visit and educated to contact RN or MD for any questions.     Plan reviewed with patient:   Refilled cardiac medications      -Schedule for MRI/MRA- follow-up with cardiology after testing   - Referral for sleep medicine-    - Referral to weight management-    - MTM referral         Reviewed Med list and verified all medications with patient.   Lab:  results reviewed in clinic. Patient demonstrated understanding.   Diet: Patient instructed regarding a heart healthy diet, including discussion of reduced fat and sodium intake. Patient demonstrated understanding of this information and agreed to call with further questions or concerns  Completed AVS  Follow-up orders placed  Marked chart \"Ready for Checkout\"      Patient verbalized understanding of plan and follow-up and agreed to call with further questions or concerns.     Patient will be scheduled after visit    Kelly Rowan RN      "

## 2025-06-28 NOTE — LETTER
6/28/2025      RE: Dino Best  [address] [place] [address]       Dear Colleague,    Thank you for the opportunity to participate in the care of your patient, Dino Best, at the [facility] [place] [facility]. Please see a copy of my visit note below.    CARDIOLOGY CONSULTATION:    Mr. Best is a 60 year old male with a past medical history significant for CAD (stenting to the RCA 2001, 2004, 2005 ), HTN, Hyperlipidemia, BMI 44, KAMAR uses CPAP.    His last cholesterol 8/2024 showed , HDL 40.  He says his crestor was increased to 40 mg but he has gotten refills sometimes and they only gave him 20 mg.  He is on zetia 10 mg daily as well.    A1C 2/2024 6.3.  Last echo 5/2021 with normal EF and no RWMA. AO 4.2 CM with no valve disease. Has had CTA of the chest where aorta has measured 4.0 CM, most recently in 2016.     Denies cardiac symptoms other than SOB. With his heart attacks, he had pain in his left shoulder and SOB.  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  He drinks 5 drinks a night some nights, which he knows has contributed with his weight gain (lost 25 lbs not drinking last year).  He does use the CPAP but thinks he needs a new one.  He is interested in starting an exercise program and losing weight.  No family history of early CAD (younger and older brother).  Forgot to take meds yesterday and has not taken them yet this AM, so BP up a bit. Needs refills on meds.       PAST MEDICAL HISTORY:  Past Medical History:   Diagnosis Date     Arthritis of knee      CAD (coronary artery disease)      Diabetes (H)      HTN (hypertension)      Motion sickness      PONV (postoperative nausea and vomiting)      Sleep apnea      Uncomplicated asthma        CURRENT MEDICATIONS:  Current Outpatient Medications   Medication Sig Dispense Refill     aspirin 81 MG EC tablet Take 1 tablet (81 mg) by mouth daily       ezetimibe  (ZETIA) 10 MG tablet Take 1 tablet (10 mg) by mouth daily. 90 tablet 3     lisinopril (ZESTRIL) 20 MG tablet Take 1 tablet (20 mg) by mouth daily. 90 tablet 3     metoprolol succinate ER (TOPROL XL) 25 MG 24 hr tablet Take 1 tablet (25 mg) by mouth daily. 90 tablet 3     rosuvastatin (CRESTOR) 20 MG tablet Take 1 tablet (20 mg) by mouth daily. 90 tablet 3     tadalafil (ADCIRCA/CIALIS) 20 MG tablet Take 1 tablet (20 mg) by mouth as needed. 30 tablet 5     acetaminophen (TYLENOL) 500 MG tablet Take 500-1,000 mg by mouth every 6 hours as needed for mild pain (Patient not taking: Reported on 6/28/2025)       cetirizine (ZYRTEC) 10 MG tablet Take 10 mg by mouth daily as needed for allergies (Patient not taking: Reported on 6/28/2025)       methocarbamol (ROBAXIN) 750 MG tablet Take 1 tablet (750 mg) by mouth every 6 hours as needed for muscle spasms (Patient not taking: Reported on 6/28/2025) 30 tablet 0     nitroGLYcerin (NITROSTAT) 0.4 MG sublingual tablet Place 0.4 mg under the tongue (Patient not taking: Reported on 6/28/2025)       oxyCODONE (ROXICODONE) 5 MG tablet Take 1 tablet (5 mg) by mouth every 4 hours as needed for moderate to severe pain (Patient not taking: Reported on 6/28/2025) 20 tablet 0     polyethylene glycol (MIRALAX) 17 GM/Dose powder Take 1 Capful by mouth daily as needed for constipation (Patient not taking: Reported on 6/28/2025)       senna-docusate (SENOKOT-S/PERICOLACE) 8.6-50 MG tablet Take 1 tablet by mouth daily as needed for constipation (Patient not taking: Reported on 6/28/2025)         PAST SURGICAL HISTORY:  Past Surgical History:   Procedure Laterality Date     COLONOSCOPY       DISCECTOMY, FUSION CERVICAL ANTERIOR THREE+ LEVELS, COMBINED N/A 3/8/2024    Procedure: CERVICAL 4 TO CERVICAL 7 ANTERIOR CERVICAL DISCECTOMY AND CERVICAL 4 TO CERVICAL 7 FUSION;  Surgeon: Mj Jara MD;  Location: [facility] OR     ENT SURGERY       ORTHOPEDIC SURGERY Left     rotator cuff     STENT, CORONARY, CR   07/01/2004       ALLERGIES  Atorvastatin and Simvastatin    FAMILY HX:  Family History   Problem Relation Age of Onset     C.A.D. Father        SOCIAL HX:  Social History     Socioeconomic History     Marital status: Single   Tobacco Use     Smoking status: Former     Types: Cigarettes     Smokeless tobacco: Never   Substance and Sexual Activity     Alcohol use: Yes     Comment: 8 drinks a week     Drug use: Yes     Types: Marijuana     Social Drivers of Health     Financial Resource Strain: Low Risk  (12/3/2024)    Financial Resource Strain      Within the past 12 months, have you or your family members you live with been unable to get utilities (heat, electricity) when it was really needed?: No   Food Insecurity: Low Risk  (12/3/2024)    Food Insecurity      Within the past 12 months, did you worry that your food would run out before you got money to buy more?: No      Within the past 12 months, did the food you bought just not last and you didn t have money to get more?: No   Transportation Needs: Low Risk  (12/3/2024)    Transportation Needs      Within the past 12 months, has lack of transportation kept you from medical appointments, getting your medicines, non-medical meetings or appointments, work, or from getting things that you need?: No   Interpersonal Safety: Not At Risk (3/1/2024)    Received from [facility]     Humiliation, Afraid, Rape, and Kick questionnaire      Fear of Current or Ex-Partner: No      Emotionally Abused: No      Physically Abused: No      Sexually Abused: No   Housing Stability: Low Risk  (12/3/2024)    Housing Stability      Do you have housing? : Yes      Are you worried about losing your housing?: No       ROS:  Constitutional: No fever, chills, or sweats. No weight gain/loss.   ENT: No visual disturbance, ear ache, epistaxis, sore throat.   Allergies/Immunologic: Negative.   Respiratory: No cough, hemoptysis.   Cardiovascular: As per HPI.   GI: No nausea, vomiting,  "hematemesis, melena, or hematochezia.   : No urinary frequency, dysuria, or hematuria.   Integument: Negative.   Psychiatric: Negative.   Neuro: Negative.   Endocrinology: Negative.   Musculoskeletal: No myalgia.    VITAL SIGNS:  /82   Pulse 62   Wt (!) 137.6 kg (303 lb 4.8 oz)   SpO2 98%   BMI 42.30 kg/m    Body mass index is 42.3 kg/m .  Wt Readings from Last 2 Encounters:   06/28/25 (!) 137.6 kg (303 lb 4.8 oz)   04/23/25 (!) 143.8 kg (317 lb)       PHYSICAL EXAM  Dino Best IS A 60 year old male.in no acute distress.  HEENT: Unremarkable.  Neck: JVP normal.    Lungs: CTA.  Cor: RRR. Normal S1 and S2.  No murmur, rub, or gallop.  PMI in Lf 5th ICS.  Abd: Soft  Extremities: No C/C/E. .  Neuro: Grossly intact.    LABS    No results found for: \"WBC\"  No results found for: \"RBC\"  Lab Results   Component Value Date    HGB 15.2 03/09/2024       Recent Labs   Lab Test 03/09/24  0742 03/08/24  1202 03/08/24  0556     --   --    POTASSIUM 4.5  --  4.5   CHLORIDE 105  --   --    CO2 23  --   --    ANIONGAP 12  --   --    * 157* 137*   BUN 12.3  --   --    CR 0.89  --  1.04   ARTURO 9.1  --   --        ASSESSMENT AND PLAN:  #1 CAD (stenting to the RCA 2001, 2004, 2005 )  #2 HTN  #3 Hyperlipidemia  #4 BMI 44  #5 KAMAR.  #6 AO 4.2 CM  #7 ETOH dependence  #8 SOB  #9 Pre-diabetes     It was a pleasure to be involved in the care of .  Reviewed his history and symptoms in detail and testing as outlined.  He is having some symptoms of shortness of breath, which is likely multifactorial, but it was one of his symptoms with his prior cardiac events, so we will proceed with stress testing before starting an exercise regimen.  We discussed doing a cardiac MRI and MRA with stress, so that we will also be able to evaluate his aortic dimensions.      He reports his blood pressure is usually under control and he only forgets his medicines about once per week.  He needs refills which were provided " today.    He is interested in losing weight, which is important of course for his overall health.  Will refer him to weight management clinic as well as our [facility] pharmacy team and hopefully get approval for medications to help him on his weight loss journey.  He knows he needs to limit ETOH intake.    He feels he needs a new CPAP, mask so referred him to sleep medicine.    Follow-up will be scheduled in the general cardiology clinic after testing.    He understands and is in agreement with the plan.  It was a pleasure to be involved in his care. Please do not hesitate to contact me with questions.     PHILIP Butler MD   34 minutes Face-face, documentation and review of records on day of visit       Please do not hesitate to contact me if you have any questions/concerns.     Sincerely,     Larissa Butler MD

## 2025-06-30 ENCOUNTER — PATIENT OUTREACH (OUTPATIENT)
Dept: CARE COORDINATION | Facility: CLINIC | Age: 60
End: 2025-06-30
Payer: COMMERCIAL

## 2025-07-01 ENCOUNTER — RESULTS FOLLOW-UP (OUTPATIENT)
Dept: INTERNAL MEDICINE | Facility: CLINIC | Age: 60
End: 2025-07-01

## 2025-07-02 ENCOUNTER — PATIENT OUTREACH (OUTPATIENT)
Dept: CARE COORDINATION | Facility: CLINIC | Age: 60
End: 2025-07-02
Payer: COMMERCIAL

## 2025-07-07 ENCOUNTER — TELEPHONE (OUTPATIENT)
Dept: CARDIOLOGY | Facility: CLINIC | Age: 60
End: 2025-07-07
Payer: COMMERCIAL

## 2025-07-10 DIAGNOSIS — Z98.1 S/P CERVICAL SPINAL FUSION: Primary | ICD-10-CM

## 2025-07-11 NOTE — PROGRESS NOTES
In-Person Visit    Spine and Related Surgical Hx:  05/04/2023 - Left shoulder RCR (supraspinatus, subscapularis), subacromial decompression and bursectomy, biceps tenotomy (Dr. Ladarius Kaur, [facility], [facility]).  ??? - Left CTR (Dr. Holt, [facility]).  03/08/2024 - 3-level ACDF with plate fixation C4-C7; use of Magnetos allograft (Dr. Mj Jara, [facility]) for multilevel spondylosis; left C5-C7 radiculopathy with 2/5 left deltoid strength.  [Implants: NuVasive anterior cervical plate; Cohere porous PEEK cages 16 x 14 mm].      REASON FOR CONSULTATION: Consult (2nd opinion on cervical spine pain, spinal fusion surgery with [facility] on 3/8/24, [place])     REFERRING PHYSICIAN: Timmy Mayen  PCP:Hector Sidhu      History of Present Illness:  60 year old male, referred by Dr. Timmy Mayen (hand surgeon, [facility]) for left hand pain with history of cervical spine fusion.    Unaccompanied  Presents to clinic today for evaluation of left hand numbness and throbbing along with pain which has been present for a number of years.  He states that a couple years ago he sustained a fall because his left leg gave out.  This fall resulted in a left shoulder rotator cuff tear.  The symptoms started after this fall. He had left rotator cuff surgery that has failed to restore his left shoulder strength and mobility.  This was performed by Dr. Kaur at [facility]. He also had a left carpal tunnel release on February 12, 2024 which did not result in any symptom improvement.  The patient states that he then underwent a C4-7 ACDF with Dr. Jara for the symptoms back in  March 2024 but the surgery did not result in any improvement in the symptoms.  He occasionally notes some left lateral shoulder shooting type pain down to the elbow along with some left-sided neck pain.  However, he localizes most of the symptoms to the left middle, ring, and small fingers.  He states the symptoms wake him  up in the middle the night.  He has not recently had any injections in the neck.  He is not currently taking any medications for the symptoms.  He did physical therapy many years ago which did not result in any improvement in his symptoms.  He currently states that nothing helps his symptoms and that none of the surgeries have helped with his symptoms.  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  He anticipates working another 10 years.      Of note, he recently had an EMG performed on 4/25/2025 which demonstrated electrodiagnostic evidence of left C5, C6 and C7 radiculopathies, vs anterior myelopathy at the same levels. The denervation changes appreciated appear both recent/active as well as chronic.     He also states that he would like his low back to be evaluated.  He reports that he has 20% low back pain and 80% left lower extremity pain, numbness, tingling.  He states that the symptoms have been ongoing for many years.  He states that the symptoms are localized to the lateral aspect of the thigh and leg and radiate to the top of the foot.  He states that these symptoms significantly impair his ability to stand and walk.  He can only stand for approximately 10 minutes before having to sit down.  He can walk a couple blocks before having to sit down.  He states that he had 3 prior injections in the lumbar spine many years ago.  The first 2 helped but the third 1 did not.  He also did physical therapy for his lumbar spine many years ago which did not help.  He states that he was seeing an outside surgeon and was considering a lumbar spine surgery but never went forward with it.    Back 20%, Leg 80%,  Left Only  Neck 10%, Arm 90%,  Left Only  Worse: Activity, sleeping, work as an excavator  Better: Nothing has resolved the symptoms    Previous treatment:   Cervical Spine  Has not tried any medications, injections  Previous left shoulder physical therapy without improvement in  "symptoms    Lumbar Spine  States that he had 3 prior lumbar injections, presumably epidural injections for spinal stenosis.  States that the first 2 helped his back and left lower extremity symptoms but the third injection did not  Completed lumbar physical therapy many years ago which did not improve his symptoms  No medications    Previous Injections:  ??? - 3 prior lumbar injections, presumably epidural injections for spinal stenosis.  States that the first 2 helped his back and left lower extremity symptoms but the third injection did not    Previous EMGs:  4/25/25 - EMG bilateral UE (Dr. Christensen, [facility]).  Interpretation:  \"Abnormal study.   -- ... evidence of left C5, C6 and C7 radiculopathies, vs anterior myelopathy at the same levels. The denervation changes appreciated appear both recent/active as well as chronic.  -- ... evidence of a left median neuropathy at the wrist as seen in carpal tunnel syndrome, moderate severity.  -- NO evidence of ulnar mononeuropathy in the left upper extremity.\"      Oswestry (GISELE) Questionnaire        7/15/2025     8:25 AM   OSWESTRY DISABILITY INDEX   Count 10    Sum 21    Oswestry Score (%) 42 %        Patient-reported        GISELE 7/15/25 42%     Neck Disability Index (NDI) Questionnaire        7/15/2025     8:32 AM   Neck Disability Index (NDI)   Neck Disability Index: Count 10   NDI: Total Score = SUM (points for all 10 findings) 16   Neck Disability in Percent = (Total Score) / 50 * 100 32 (%)      NDI 7/15/25 32%      Visual Analog Pain Scale  Back Pain Scale 0-10: 5 (low back pain)  Right leg pain: 0  Left leg pain: 5 (pain from hip to foot)  Neck Pain Scale 0-10: 0  Right arm pain: 0  Left arm pain: 5 (pain from shoulder to hand)    PROMIS-10 Scores  Global Mental Health Score: (Patient-Rptd) (P) 13  Global Physical Health Score: (Patient-Rptd) (P) 10  PROMIS TOTAL - SUBSCORES: (Patient-Rptd) (P) 23    ROS:  A 12-point review of systems was completed and is negative " except for otherwise noted above in the history of present illness.    Med Hx:  Past Medical History:   Diagnosis Date    Arthritis of knee     CAD (coronary artery disease)     Diabetes (H)     HTN (hypertension)     Motion sickness     PONV (postoperative nausea and vomiting)     Sleep apnea     Uncomplicated asthma        Surg Hx:  Past Surgical History:   Procedure Laterality Date    COLONOSCOPY      DISCECTOMY, FUSION CERVICAL ANTERIOR THREE+ LEVELS, COMBINED N/A 3/8/2024    Procedure: CERVICAL 4 TO CERVICAL 7 ANTERIOR CERVICAL DISCECTOMY AND CERVICAL 4 TO CERVICAL 7 FUSION;  Surgeon: Mj Jara MD;  Location: [facility] OR    ENT SURGERY      ORTHOPEDIC SURGERY Left     rotator cuff    STENT, CORONARY, CR  07/01/2004       Allergies:  Allergies   Allergen Reactions    Atorvastatin Other (See Comments)     Hepatic dysfunction    Simvastatin Other (See Comments)     Hepatic dysfunction       Meds:  Current Outpatient Medications   Medication Sig Dispense Refill    acetaminophen (TYLENOL) 500 MG tablet Take 500-1,000 mg by mouth every 6 hours as needed for mild pain      aspirin 81 MG EC tablet Take 1 tablet (81 mg) by mouth daily      cetirizine (ZYRTEC) 10 MG tablet Take 10 mg by mouth daily as needed for allergies      ezetimibe (ZETIA) 10 MG tablet Take 1 tablet (10 mg) by mouth daily. 90 tablet 3    lisinopril (ZESTRIL) 20 MG tablet Take 1 tablet (20 mg) by mouth daily. 90 tablet 3    methocarbamol (ROBAXIN) 750 MG tablet Take 1 tablet (750 mg) by mouth every 6 hours as needed for muscle spasms 30 tablet 0    metoprolol succinate ER (TOPROL XL) 25 MG 24 hr tablet Take 1 tablet (25 mg) by mouth daily. 90 tablet 3    nitroGLYcerin (NITROSTAT) 0.4 MG sublingual tablet Place 1 tablet (0.4 mg) under the tongue every 5 minutes as needed for chest pain. 15 tablet 3    oxyCODONE (ROXICODONE) 5 MG tablet Take 1 tablet (5 mg) by mouth every 4 hours as needed for moderate to severe pain 20 tablet 0    polyethylene glycol  "(MIRALAX) 17 GM/Dose powder Take 1 Capful by mouth daily as needed for constipation      rosuvastatin (CRESTOR) 40 MG tablet Take 1 tablet (40 mg) by mouth daily. 90 tablet 3    senna-docusate (SENOKOT-S/PERICOLACE) 8.6-50 MG tablet Take 1 tablet by mouth daily as needed for constipation      tadalafil (ADCIRCA/CIALIS) 20 MG tablet Take 1 tablet (20 mg) by mouth as needed. 30 tablet 5     No current facility-administered medications for this visit.       Fam Hx:  Family History   Problem Relation Age of Onset    C.A.D. Father        P/S Hx:  Social History     Tobacco Use    Smoking status: Former     Types: Cigarettes    Smokeless tobacco: Never   Substance Use Topics    Alcohol use: Yes     Comment: 8 drinks a week         Physical Exam:  Very pleasant, healthy appearing, alert, oriented x 3, cooperative.  Normal mood and affect.  Not in cardiorespiratory distress.  Ht 1.803 m (5' 11\")   Wt (!) 139.7 kg (308 lb)   BMI 42.96 kg/m    Normal upright posture.    Normal gait without assistive device.  No antalgia / imbalance.    No gross spinal deformity, no skin lesions or surgical scars.  Localizes pain at left middle, ring, pinky finger.  Also over the left lateral deltoid region.  Additionally over the midline low back and L5 distribution left leg  Tenderness: (-) midline, (-) paraspinal, (-) R and L PSIS.  ROM:   Limited cervical motion secondary to prior fusion.  Limited lumbar flexion and extension secondary to discomfort    Neuro Exam:  Motor: 2/5 left upper extremity abduction, otherwise 5/5 bilateral upper extremity strength.  5/5 bilateral lower extremity strength  Sensory: Diminished sensation along the left middle ring and pinky finger.  Additionally, diminished sensation along the lateral aspect of the left lower extremity and dorsum of the foot    Lower Extremity:  Equal leg lengths, full pulses, (-) atrophy / asymmetry.  Full painless passive knee and ankle motion.  Straight leg raise: (-) right, (+) " left.  Hip impingement: (-) right, (-) left.  LESLIE/Aaron's: (-) right, (-) left.    Imaging:    EOS x-rays obtained today demonstrate maintained cervical lordosis with intact instrumentation from C4-C7 without evidence of obvious lucency or cage subsidence.  Evaluation of the lumbar spine demonstrates multilevel spondylosis and degenerative disc disease with grade 1 degenerative anterolisthesis at L4-5    Prior MRI performed on 5/12/2017 was reviewed and demonstrates degenerative spondylolisthesis at L4-5 with spinal stenosis present at L4-5 and L5-S1    Assessment:    61 y/o RHD M  Left middle, ring, pinky finger numbness, throbbing, pain for multiple years.  Unchanged with all of the below surgeries.  EMG performed on 4/25/2025 which demonstrated electrodiagnostic evidence of left C5, C6 and C7 radiculopathies, vs anterior myelopathy at the same levels. The denervation changes appreciated appear both recent/active as well as chronic.  Low back and left lower extremity L5 radiculopathy present for multiple years.  Reports 3 prior lumbar injections, first 2 helped but the third did not   16 mo s/p C4-7 ACDF (Dr. Mj Jara, [facility]) for multilevel spondylosis; left C5-C7 radiculopathy with 2/5 left deltoid strength (3/8/2024).  No improvement in preoperative symptoms  17 mo s/p L CTR (Dr. Holt, [facility]). No improvement in left hand symptoms.   S/p left shoulder rotator cuff repair (Dr. Kaur, [facility]).  No improvement in left shoulder strength or mobility    Plan:    We reviewed the imaging and EMG results with the patient in depth.  We discussed that the EMG demonstrates evidence of cervical radiculopathy.  However, the patient underwent a prior C4-7 ACDF for the symptoms which did not result in any improvement.  We discussed that we will further evaluate the cervical spine with some additional imaging studies.  We will also obtain new imaging studies of his lumbar spine as this is symptomatic as well.  We  will plan to see him in person or virtually after the imaging studies have been obtained.  At that point we will discuss further nonoperative versus operative management for his cervical and lumbar spine    - Cervical CT scan to evaluate for fusion and residual foraminal stenosis  - Cervical MRI to evaluate for nerve root compression  - Lumbar CT and MRI to evaluate for lumbar stenosis  - Retrieve operative report from Dr. Holt at [facility] who performed the left hand carpal tunnel release.  - Obtain flexion and extension x-rays of the cervical and lumbar spine prior to leaving clinic today    In-person or virtual RTC after above, to review and discuss further options, including possible injections vs. Therapy vs. further operative treatment.    Felton Jauregui, DO  Spine Fellow    Attestation:  I (Dr. Lionel Bartlett - Spine Surgeon) have personally evaluated patient with Spine Fellow Dr. Felton Jauregui, and agree with findings and plan outlined in the note, which I also edited.  I discussed at length with the patient/family, explained the nature of spinal condition, and formulated workup and/or treatment plan together.  All questions were answered to the best of my ability and to patient's apparent satisfaction.     I personally spent >45 minutes on the date of the encounter doing chart review/review of outside records/review of test results/interpretation of tests/patient visit/documentation/discussion with other provider(s)/discussion with patient and family.    Lionel Bartlett MD    Orthopaedic Spine Surgery  Dept Orthopaedic Surgery, [facility]  [phone] office, [phone] pager  [email or web address]

## 2025-07-13 ENCOUNTER — HEALTH MAINTENANCE LETTER (OUTPATIENT)
Age: 60
End: 2025-07-13

## 2025-07-15 ENCOUNTER — ANCILLARY PROCEDURE (OUTPATIENT)
Dept: GENERAL RADIOLOGY | Facility: CLINIC | Age: 60
End: 2025-07-15
Attending: ORTHOPAEDIC SURGERY
Payer: COMMERCIAL

## 2025-07-15 ENCOUNTER — PRE VISIT (OUTPATIENT)
Dept: ORTHOPEDICS | Facility: CLINIC | Age: 60
End: 2025-07-15

## 2025-07-15 ENCOUNTER — OFFICE VISIT (OUTPATIENT)
Dept: ORTHOPEDICS | Facility: CLINIC | Age: 60
End: 2025-07-15
Attending: ORTHOPAEDIC SURGERY
Payer: COMMERCIAL

## 2025-07-15 VITALS — HEIGHT: 71 IN | BODY MASS INDEX: 43.12 KG/M2 | WEIGHT: 308 LBS

## 2025-07-15 DIAGNOSIS — Z98.1 S/P CERVICAL SPINAL FUSION: ICD-10-CM

## 2025-07-15 DIAGNOSIS — M79.642 LEFT HAND PAIN: ICD-10-CM

## 2025-07-15 PROCEDURE — 99215 OFFICE O/P EST HI 40 MIN: CPT | Performed by: STUDENT IN AN ORGANIZED HEALTH CARE EDUCATION/TRAINING PROGRAM

## 2025-07-15 PROCEDURE — 72040 X-RAY EXAM NECK SPINE 2-3 VW: CPT | Performed by: RADIOLOGY

## 2025-07-15 PROCEDURE — 72082 X-RAY EXAM ENTIRE SPI 2/3 VW: CPT | Mod: XU | Performed by: STUDENT IN AN ORGANIZED HEALTH CARE EDUCATION/TRAINING PROGRAM

## 2025-07-15 PROCEDURE — 77073 BONE LENGTH STUDIES: CPT | Performed by: STUDENT IN AN ORGANIZED HEALTH CARE EDUCATION/TRAINING PROGRAM

## 2025-07-15 PROCEDURE — 72100 X-RAY EXAM L-S SPINE 2/3 VWS: CPT | Mod: XU | Performed by: STUDENT IN AN ORGANIZED HEALTH CARE EDUCATION/TRAINING PROGRAM

## 2025-07-15 NOTE — LETTER
7/15/2025      Dino Best  [address]  [place] [address]      Dear Colleague,    Thank you for referring your patient, Dino Best, to the [facility] [place]. Please see a copy of my visit note below.    In-Person Visit    Spine and Related Surgical Hx:  05/04/2023 - Left shoulder RCR (supraspinatus, subscapularis), subacromial decompression and bursectomy, biceps tenotomy (Dr. Ladarius Kaur, [facility], [facility]).  ??? - Left CTR (Dr. Holt, [facility]).  03/08/2024 - 3-level ACDF with plate fixation C4-C7; use of Magnetos allograft (Dr. Mj Jara, [facility]) for multilevel spondylosis; left C5-C7 radiculopathy with 2/5 left deltoid strength.  [Implants: NuVasive anterior cervical plate; Cohere porous PEEK cages 16 x 14 mm].      REASON FOR CONSULTATION: Consult (2nd opinion on cervical spine pain, spinal fusion surgery with [facility] on 3/8/24, [place])     REFERRING PHYSICIAN: Timmy Mayen  PCP:Hector Sidhu      History of Present Illness:  60 year old male, referred by Dr. Timmy Mayen (hand surgeon, [facility]) for left hand pain with history of cervical spine fusion.    Unaccompanied  Presents to clinic today for evaluation of left hand numbness and throbbing along with pain which has been present for a number of years.  He states that a couple years ago he sustained a fall because his left leg gave out.  This fall resulted in a left shoulder rotator cuff tear.  The symptoms started after this fall. He had left rotator cuff surgery that has failed to restore his left shoulder strength and mobility.  This was performed by Dr. Kaur at [facility] orthopedics. He also had a left carpal tunnel release on February 12, 2024 which did not result in any symptom improvement.  The patient states that he then underwent a C4-7 ACDF with Dr. Jara for the symptoms back in  March 2024 but the surgery did not result in any improvement in the symptoms.  He  occasionally notes some left lateral shoulder shooting type pain down to the elbow along with some left-sided neck pain.  However, he localizes most of the symptoms to the left middle, ring, and small fingers.  He states the symptoms wake him up in the middle the night.  He has not recently had any injections in the neck.  He is not currently taking any medications for the symptoms.  He did physical therapy many years ago which did not result in any improvement in his symptoms.  He currently states that nothing helps his symptoms and that none of the surgeries have helped with his symptoms.  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  He anticipates working another 10 years.      Of note, he recently had an EMG performed on 4/25/2025 which demonstrated electrodiagnostic evidence of left C5, C6 and C7 radiculopathies, vs anterior myelopathy at the same levels. The denervation changes appreciated appear both recent/active as well as chronic.     He also states that he would like his low back to be evaluated.  He reports that he has 20% low back pain and 80% left lower extremity pain, numbness, tingling.  He states that the symptoms have been ongoing for many years.  He states that the symptoms are localized to the lateral aspect of the thigh and leg and radiate to the top of the foot.  He states that these symptoms significantly impair his ability to stand and walk.  He can only stand for approximately 10 minutes before having to sit down.  He can walk a couple blocks before having to sit down.  He states that he had 3 prior injections in the lumbar spine many years ago.  The first 2 helped but the third 1 did not.  He also did physical therapy for his lumbar spine many years ago which did not help.  He states that he was seeing an outside surgeon and was considering a lumbar spine surgery but never went forward with it.    Back 20%, Leg 80%,  Left Only  Neck 10%, Arm 90%,  Left  "Only  Worse: Activity, sleeping, work as an excavator  Better: Nothing has resolved the symptoms    Previous treatment:   Cervical Spine  Has not tried any medications, injections  Previous left shoulder physical therapy without improvement in symptoms    Lumbar Spine  States that he had 3 prior lumbar injections, presumably epidural injections for spinal stenosis.  States that the first 2 helped his back and left lower extremity symptoms but the third injection did not  Completed lumbar physical therapy many years ago which did not improve his symptoms  No medications    Previous Injections:  ??? - 3 prior lumbar injections, presumably epidural injections for spinal stenosis.  States that the first 2 helped his back and left lower extremity symptoms but the third injection did not    Previous EMGs:  4/25/25 - EMG bilateral UE (Dr. Christensen, [facility]).  Interpretation:  \"Abnormal study.   -- ... evidence of left C5, C6 and C7 radiculopathies, vs anterior myelopathy at the same levels. The denervation changes appreciated appear both recent/active as well as chronic.  -- ... evidence of a left median neuropathy at the wrist as seen in carpal tunnel syndrome, moderate severity.  -- NO evidence of ulnar mononeuropathy in the left upper extremity.\"      Oswestry (GISELE) Questionnaire        7/15/2025     8:25 AM   OSWESTRY DISABILITY INDEX   Count 10    Sum 21    Oswestry Score (%) 42 %        Patient-reported        GISELE 7/15/25 42%     Neck Disability Index (NDI) Questionnaire        7/15/2025     8:32 AM   Neck Disability Index (NDI)   Neck Disability Index: Count 10   NDI: Total Score = SUM (points for all 10 findings) 16   Neck Disability in Percent = (Total Score) / 50 * 100 32 (%)      NDI 7/15/25 32%      Visual Analog Pain Scale  Back Pain Scale 0-10: 5 (low back pain)  Right leg pain: 0  Left leg pain: 5 (pain from hip to foot)  Neck Pain Scale 0-10: 0  Right arm pain: 0  Left arm pain: 5 (pain from shoulder to " hand)    PROMIS-10 Scores  Global Mental Health Score: (Patient-Rptd) (P) 13  Global Physical Health Score: (Patient-Rptd) (P) 10  PROMIS TOTAL - SUBSCORES: (Patient-Rptd) (P) 23    ROS:  A 12-point review of systems was completed and is negative except for otherwise noted above in the history of present illness.    Med Hx:  Past Medical History:   Diagnosis Date     Arthritis of knee      CAD (coronary artery disease)      Diabetes (H)      HTN (hypertension)      Motion sickness      PONV (postoperative nausea and vomiting)      Sleep apnea      Uncomplicated asthma        Surg Hx:  Past Surgical History:   Procedure Laterality Date     COLONOSCOPY       DISCECTOMY, FUSION CERVICAL ANTERIOR THREE+ LEVELS, COMBINED N/A 3/8/2024    Procedure: CERVICAL 4 TO CERVICAL 7 ANTERIOR CERVICAL DISCECTOMY AND CERVICAL 4 TO CERVICAL 7 FUSION;  Surgeon: Mj Jara MD;  Location: [facility] OR     ENT SURGERY       ORTHOPEDIC SURGERY Left     rotator cuff     STENT, CORONARY, CR  07/01/2004       Allergies:  Allergies   Allergen Reactions     Atorvastatin Other (See Comments)     Hepatic dysfunction     Simvastatin Other (See Comments)     Hepatic dysfunction       Meds:  Current Outpatient Medications   Medication Sig Dispense Refill     acetaminophen (TYLENOL) 500 MG tablet Take 500-1,000 mg by mouth every 6 hours as needed for mild pain       aspirin 81 MG EC tablet Take 1 tablet (81 mg) by mouth daily       cetirizine (ZYRTEC) 10 MG tablet Take 10 mg by mouth daily as needed for allergies       ezetimibe (ZETIA) 10 MG tablet Take 1 tablet (10 mg) by mouth daily. 90 tablet 3     lisinopril (ZESTRIL) 20 MG tablet Take 1 tablet (20 mg) by mouth daily. 90 tablet 3     methocarbamol (ROBAXIN) 750 MG tablet Take 1 tablet (750 mg) by mouth every 6 hours as needed for muscle spasms 30 tablet 0     metoprolol succinate ER (TOPROL XL) 25 MG 24 hr tablet Take 1 tablet (25 mg) by mouth daily. 90 tablet 3     nitroGLYcerin (NITROSTAT) 0.4 MG  "sublingual tablet Place 1 tablet (0.4 mg) under the tongue every 5 minutes as needed for chest pain. 15 tablet 3     oxyCODONE (ROXICODONE) 5 MG tablet Take 1 tablet (5 mg) by mouth every 4 hours as needed for moderate to severe pain 20 tablet 0     polyethylene glycol (MIRALAX) 17 GM/Dose powder Take 1 Capful by mouth daily as needed for constipation       rosuvastatin (CRESTOR) 40 MG tablet Take 1 tablet (40 mg) by mouth daily. 90 tablet 3     senna-docusate (SENOKOT-S/PERICOLACE) 8.6-50 MG tablet Take 1 tablet by mouth daily as needed for constipation       tadalafil (ADCIRCA/CIALIS) 20 MG tablet Take 1 tablet (20 mg) by mouth as needed. 30 tablet 5     No current facility-administered medications for this visit.       Fam Hx:  Family History   Problem Relation Age of Onset     C.A.D. Father        P/S Hx:  Social History     Tobacco Use     Smoking status: Former     Types: Cigarettes     Smokeless tobacco: Never   Substance Use Topics     Alcohol use: Yes     Comment: 8 drinks a week         Physical Exam:  Very pleasant, healthy appearing, alert, oriented x 3, cooperative.  Normal mood and affect.  Not in cardiorespiratory distress.  Ht 1.803 m (5' 11\")   Wt (!) 139.7 kg (308 lb)   BMI 42.96 kg/m    Normal upright posture.    Normal gait without assistive device.  No antalgia / imbalance.    No gross spinal deformity, no skin lesions or surgical scars.  Localizes pain at left middle, ring, pinky finger.  Also over the left lateral deltoid region.  Additionally over the midline low back and L5 distribution left leg  Tenderness: (-) midline, (-) paraspinal, (-) R and L PSIS.  ROM:   Limited cervical motion secondary to prior fusion.  Limited lumbar flexion and extension secondary to discomfort    Neuro Exam:  Motor: 2/5 left upper extremity abduction, otherwise 5/5 bilateral upper extremity strength.  5/5 bilateral lower extremity strength  Sensory: Diminished sensation along the left middle ring and pinky " finger.  Additionally, diminished sensation along the lateral aspect of the left lower extremity and dorsum of the foot    Lower Extremity:  Equal leg lengths, full pulses, (-) atrophy / asymmetry.  Full painless passive knee and ankle motion.  Straight leg raise: (-) right, (+) left.  Hip impingement: (-) right, (-) left.  LESLIE/Aaron's: (-) right, (-) left.    Imaging:    EOS x-rays obtained today demonstrate maintained cervical lordosis with intact instrumentation from C4-C7 without evidence of obvious lucency or cage subsidence.  Evaluation of the lumbar spine demonstrates multilevel spondylosis and degenerative disc disease with grade 1 degenerative anterolisthesis at L4-5    Prior MRI performed on 5/12/2017 was reviewed and demonstrates degenerative spondylolisthesis at L4-5 with spinal stenosis present at L4-5 and L5-S1    Assessment:    61 y/o RHD M  Left middle, ring, pinky finger numbness, throbbing, pain for multiple years.  Unchanged with all of the below surgeries.  EMG performed on 4/25/2025 which demonstrated electrodiagnostic evidence of left C5, C6 and C7 radiculopathies, vs anterior myelopathy at the same levels. The denervation changes appreciated appear both recent/active as well as chronic.  Low back and left lower extremity L5 radiculopathy present for multiple years.  Reports 3 prior lumbar injections, first 2 helped but the third did not   16 mo s/p C4-7 ACDF (Dr. Mj Jara, [facility]) for multilevel spondylosis; left C5-C7 radiculopathy with 2/5 left deltoid strength (3/8/2024).  No improvement in preoperative symptoms  17 mo s/p L CTR (Dr. Holt, [facility]). No improvement in left hand symptoms.   S/p left shoulder rotator cuff repair (Dr. Kaur, [facility]).  No improvement in left shoulder strength or mobility    Plan:    We reviewed the imaging and EMG results with the patient in depth.  We discussed that the EMG demonstrates evidence of cervical radiculopathy.  However, the patient  underwent a prior C4-7 ACDF for the symptoms which did not result in any improvement.  We discussed that we will further evaluate the cervical spine with some additional imaging studies.  We will also obtain new imaging studies of his lumbar spine as this is symptomatic as well.  We will plan to see him in person or virtually after the imaging studies have been obtained.  At that point we will discuss further nonoperative versus operative management for his cervical and lumbar spine    - Cervical CT scan to evaluate for fusion and residual foraminal stenosis  - Cervical MRI to evaluate for nerve root compression  - Lumbar CT and MRI to evaluate for lumbar stenosis  - Retrieve operative report from Dr. Holt at [facility] who performed the left hand carpal tunnel release.  - Obtain flexion and extension x-rays of the cervical and lumbar spine prior to leaving clinic today    In-person or virtual RTC after above, to review and discuss further options, including possible injections vs. Therapy vs. further operative treatment.    Felton Jauregui, DO  Spine Fellow    Attestation:  I (Dr. Lionel Bartlett - Spine Surgeon) have personally evaluated patient with Spine Fellow Dr. Felton Jauregui, and agree with findings and plan outlined in the note, which I also edited.  I discussed at length with the patient/family, explained the nature of spinal condition, and formulated workup and/or treatment plan together.  All questions were answered to the best of my ability and to patient's apparent satisfaction.     I personally spent >45 minutes on the date of the encounter doing chart review/review of outside records/review of test results/interpretation of tests/patient visit/documentation/discussion with other provider(s)/discussion with patient and family.    Lionel Bartlett MD    Orthopaedic Spine Surgery  Dept Orthopaedic Surgery, [facility]  [phone] office, [phone]  pager  [email or web address]     Again, thank you for allowing me to participate in the care of your patient.        Sincerely,        Lionel Bartlett MD    Electronically signed

## 2025-07-21 ENCOUNTER — HOSPITAL ENCOUNTER (OUTPATIENT)
Dept: MRI IMAGING | Facility: CLINIC | Age: 60
Discharge: HOME | End: 2025-07-21
Attending: INTERNAL MEDICINE | Admitting: INTERNAL MEDICINE
Payer: COMMERCIAL

## 2025-07-21 VITALS — SYSTOLIC BLOOD PRESSURE: 175 MMHG | RESPIRATION RATE: 20 BRPM | DIASTOLIC BLOOD PRESSURE: 99 MMHG | HEART RATE: 63 BPM

## 2025-07-21 DIAGNOSIS — I25.119 CORONARY ARTERY DISEASE INVOLVING NATIVE CORONARY ARTERY OF NATIVE HEART WITH ANGINA PECTORIS: ICD-10-CM

## 2025-07-21 DIAGNOSIS — I71.21 ANEURYSM OF ASCENDING AORTA WITHOUT RUPTURE: ICD-10-CM

## 2025-07-21 DIAGNOSIS — G47.33 OSA (OBSTRUCTIVE SLEEP APNEA): ICD-10-CM

## 2025-07-21 PROCEDURE — 255N000002 HC RX 255 OP 636: Performed by: INTERNAL MEDICINE

## 2025-07-21 PROCEDURE — 250N000011 HC RX IP 250 OP 636: Performed by: RADIOLOGY

## 2025-07-21 PROCEDURE — 75563 CARD MRI W/STRESS IMG & DYE: CPT

## 2025-07-21 PROCEDURE — 93005 ELECTROCARDIOGRAM TRACING: CPT

## 2025-07-21 PROCEDURE — A9585 GADOBUTROL INJECTION: HCPCS | Performed by: INTERNAL MEDICINE

## 2025-07-21 RX ORDER — LORAZEPAM 0.5 MG/1
0.5 TABLET ORAL EVERY 30 MIN PRN
Status: DISCONTINUED | OUTPATIENT
Start: 2025-07-21 | End: 2025-07-22 | Stop reason: HOSPADM

## 2025-07-21 RX ORDER — LIDOCAINE 40 MG/G
CREAM TOPICAL
Status: DISCONTINUED | OUTPATIENT
Start: 2025-07-21 | End: 2025-07-22 | Stop reason: HOSPADM

## 2025-07-21 RX ORDER — GADOBUTROL 604.72 MG/ML
0.12 INJECTION INTRAVENOUS ONCE
Status: COMPLETED | OUTPATIENT
Start: 2025-07-21 | End: 2025-07-21

## 2025-07-21 RX ORDER — DIAZEPAM 5 MG/1
5 TABLET ORAL EVERY 30 MIN PRN
Status: DISCONTINUED | OUTPATIENT
Start: 2025-07-21 | End: 2025-07-22 | Stop reason: HOSPADM

## 2025-07-21 RX ORDER — CAFFEINE 200 MG
200 TABLET ORAL
OUTPATIENT
Start: 2025-07-21 | End: 2025-07-21

## 2025-07-21 RX ORDER — ALBUTEROL SULFATE 90 UG/1
2 INHALANT RESPIRATORY (INHALATION) EVERY 5 MIN PRN
Status: DISCONTINUED | OUTPATIENT
Start: 2025-07-21 | End: 2025-07-22 | Stop reason: HOSPADM

## 2025-07-21 RX ORDER — REGADENOSON 0.08 MG/ML
0.4 INJECTION, SOLUTION INTRAVENOUS ONCE
Status: COMPLETED | OUTPATIENT
Start: 2025-07-21 | End: 2025-07-21

## 2025-07-21 RX ORDER — CAFFEINE CITRATE 20 MG/ML
60 SOLUTION INTRAVENOUS
OUTPATIENT
Start: 2025-07-21 | End: 2025-07-21

## 2025-07-21 RX ORDER — AMINOPHYLLINE 25 MG/ML
50-100 INJECTION, SOLUTION INTRAVENOUS
Status: COMPLETED | OUTPATIENT
Start: 2025-07-21 | End: 2025-07-21

## 2025-07-21 RX ADMIN — AMINOPHYLLINE 100 MG: 25 INJECTION, SOLUTION INTRAVENOUS at 10:40

## 2025-07-21 RX ADMIN — GADOBUTROL 15 ML: 604.72 INJECTION INTRAVENOUS at 11:13

## 2025-07-21 RX ADMIN — GADOBUTROL 15 ML: 604.72 INJECTION INTRAVENOUS at 11:14

## 2025-07-21 RX ADMIN — REGADENOSON 0.4 MG: 0.08 INJECTION, SOLUTION INTRAVENOUS at 10:38

## 2025-07-21 NOTE — PROGRESS NOTES
Patient presents for cardiac stress MRI and denies caffeine consumption in the past 12 hours.  Patient is educated on procedure for cardiac stress MRI including the medications that will be used and their possible side effects.  Lungs are clear bilaterally.  Patient tolerated the Lexiscan injection reporting mild SOB which patient states has resolved by four minutes post injection.  Aminophylline is given per protocol and patient is monitored x 10 mn, then is left under the care of MRI staff.

## 2025-07-22 LAB
ATRIAL RATE - MUSE: 63 BPM
ATRIAL RATE - MUSE: 66 BPM
DIASTOLIC BLOOD PRESSURE - MUSE: NORMAL MMHG
DIASTOLIC BLOOD PRESSURE - MUSE: NORMAL MMHG
INTERPRETATION ECG - MUSE: NORMAL
INTERPRETATION ECG - MUSE: NORMAL
P AXIS - MUSE: 26 DEGREES
P AXIS - MUSE: 28 DEGREES
PR INTERVAL - MUSE: 146 MS
PR INTERVAL - MUSE: 164 MS
QRS DURATION - MUSE: 94 MS
QRS DURATION - MUSE: 96 MS
QT - MUSE: 422 MS
QT - MUSE: 444 MS
QTC - MUSE: 442 MS
QTC - MUSE: 454 MS
R AXIS - MUSE: 13 DEGREES
R AXIS - MUSE: 14 DEGREES
SYSTOLIC BLOOD PRESSURE - MUSE: NORMAL MMHG
SYSTOLIC BLOOD PRESSURE - MUSE: NORMAL MMHG
T AXIS - MUSE: 41 DEGREES
T AXIS - MUSE: 52 DEGREES
VENTRICULAR RATE- MUSE: 63 BPM
VENTRICULAR RATE- MUSE: 66 BPM

## 2025-07-23 ENCOUNTER — ANCILLARY PROCEDURE (OUTPATIENT)
Dept: MRI IMAGING | Facility: CLINIC | Age: 60
End: 2025-07-23
Attending: ORTHOPAEDIC SURGERY
Payer: COMMERCIAL

## 2025-07-23 ENCOUNTER — ANCILLARY PROCEDURE (OUTPATIENT)
Dept: CT IMAGING | Facility: CLINIC | Age: 60
End: 2025-07-23
Attending: ORTHOPAEDIC SURGERY
Payer: COMMERCIAL

## 2025-07-23 DIAGNOSIS — M79.642 LEFT HAND PAIN: ICD-10-CM

## 2025-07-23 DIAGNOSIS — Z98.1 S/P CERVICAL SPINAL FUSION: ICD-10-CM

## 2025-07-23 PROCEDURE — 72125 CT NECK SPINE W/O DYE: CPT | Mod: GC | Performed by: RADIOLOGY

## 2025-07-23 PROCEDURE — 72131 CT LUMBAR SPINE W/O DYE: CPT | Mod: GC | Performed by: RADIOLOGY

## 2025-07-29 ENCOUNTER — VIRTUAL VISIT (OUTPATIENT)
Dept: ORTHOPEDICS | Facility: CLINIC | Age: 60
End: 2025-07-29
Payer: COMMERCIAL

## 2025-07-29 VITALS — HEIGHT: 71 IN | WEIGHT: 305 LBS | BODY MASS INDEX: 42.7 KG/M2

## 2025-07-29 DIAGNOSIS — G95.89 CERVICAL CORD MYELOMALACIA (H): ICD-10-CM

## 2025-07-29 DIAGNOSIS — M48.02 CERVICAL STENOSIS OF SPINAL CANAL: ICD-10-CM

## 2025-07-29 DIAGNOSIS — M40.37 FLATBACK SYNDROME OF LUMBOSACRAL REGION: ICD-10-CM

## 2025-07-29 DIAGNOSIS — Z98.1 S/P CERVICAL SPINAL FUSION: ICD-10-CM

## 2025-07-29 DIAGNOSIS — M43.16 SPONDYLOLISTHESIS OF LUMBAR REGION: Primary | ICD-10-CM

## 2025-07-29 DIAGNOSIS — M48.062 LUMBAR STENOSIS WITH NEUROGENIC CLAUDICATION: ICD-10-CM

## 2025-07-29 DIAGNOSIS — G56.02 CARPAL TUNNEL SYNDROME OF LEFT WRIST: ICD-10-CM

## 2025-07-29 PROCEDURE — 98007 SYNCH AUDIO-VIDEO EST HI 40: CPT | Performed by: ORTHOPAEDIC SURGERY

## 2025-07-29 PROCEDURE — 1126F AMNT PAIN NOTED NONE PRSNT: CPT | Performed by: ORTHOPAEDIC SURGERY

## 2025-07-29 ASSESSMENT — PAIN SCALES - GENERAL: PAINLEVEL_OUTOF10: NO PAIN (0)

## 2025-07-29 NOTE — NURSING NOTE
Current patient location: [address] [place] [address]  [place] [address]    Is the patient currently in the state of [place]? YES    Visit mode: VIDEO    If the visit is dropped, the patient can be reconnected by:VIDEO VISIT: Text to cell phone:   Telephone Information:   Mobile [phone]       Will anyone else be joining the visit? NO  (If patient encounters technical issues they should call [phone]    Are changes needed to the allergy or medication list? No    Are refills needed on medications prescribed by this physician? NO    Rooming Documentation:  Questionnaire(s) completed    Reason for visit: RECHECK    Martha Diaz VVF  No vitals

## 2025-07-29 NOTE — LETTER
"7/29/2025      Dino Best  [address]  [place] [address]      Dear Colleague,    Thank you for referring your patient, Dino Best, to the [facility]. Please see a copy of my visit note below.    VIRTUAL VISIT:  Patient is evaluated today via billable virtual visit.    The patient has been notified of following:   \"This virtual visit will be conducted via a call between you and your physician/provider. We have found that certain health care needs can be provided without the need for an in-person physical exam.  This service lets us provide the care you need with a virtual conversation.  If a prescription is necessary we can send it directly to your pharmacy.  If lab work is needed we can place an order for that and you can then stop by our lab to have the test done at a later time.  If during the course of the call the physician/provider feels a virtual visit is not appropriate, you will not be charged for this service.\"     Spine and Related Surgical Hx:  05/04/2023 - Left shoulder RCR (supraspinatus, subscapularis), subacromial decompression and bursectomy, biceps tenotomy (Dr. Ladarius Kaur, [facility], [facility]).  02/12/2024 - Left CTR (Dr. Holt, [facility]).  03/08/2024 - 3-level ACDF with plate fixation C4-C7; use of Magnetos allograft (Dr. Mj Jara, [facility]) for multilevel spondylosis; left C5-C7 radiculopathy with 2/5 left deltoid strength.  [Implants: NuVasive anterior cervical plate; Cohere porous PEEK cages 16 x 14 mm].      Physician has received verbal consent for a Virtual Visit from the patient.  Platform used:  [employer] Video.  Sent video link; patient tried joining, but we could not see each other.  Ultimately, we completed the visit via telephone.  Time:  3:01pm to 3:44pm  Originating Location (pt. Location): Home  Distant Location (provider location):  [facility]     Chief Complaint   Patient presents " with     RECHECK       Last Visit Date: 7/15/25  Previous Impression:  59 y/o RHD M  1.  Left middle, ring, pinky finger numbness, throbbing, pain for multiple years.  Unchanged with all of the below surgeries.  EMG performed on 4/25/2025 which demonstrated electrodiagnostic evidence of left C5, C6 and C7 radiculopathies, vs anterior myelopathy at the same levels. The denervation changes appreciated appear both recent/active as well as chronic.  2.  Low back and left lower extremity L5 radiculopathy present for multiple years.  Reports 3 prior lumbar injections, first 2 helped but the third did not   3.  16 mo s/p C4-7 ACDF (Dr. Mj Jara, [facility]) for multilevel spondylosis; left C5-C7 radiculopathy with 2/5 left deltoid strength (3/8/2024).  No improvement in preoperative symptoms  4.  17 mo s/p L CTR (Dr. Holt, [facility]). No improvement in left hand symptoms.   5.  S/p left shoulder rotator cuff repair (Dr. Kaur, [facility]).  No improvement in left shoulder strength or mobility  Previous Plan:  - Cervical CT scan to evaluate for fusion and residual foraminal stenosis  - Cervical MRI to evaluate for nerve root compression  - Lumbar CT and MRI to evaluate for lumbar stenosis  - Retrieve operative report from Dr. Holt at [facility] who performed the left hand carpal tunnel release.  - Obtain flexion and extension x-rays of the cervical and lumbar spine prior to leaving clinic today  In-person or virtual RTC after above, to review and discuss further options, including possible injections vs. Therapy vs. further operative treatment.      S>  60 year old male, here to review cervical and lumbar  CT and MRI, and flex-ext x-rays, and discuss further options.      Had been working on [employer] for a couple of months in 2022/23; that was when his finger problems started (left long, ring, small fingers).  Thus, the finger problems preceded his shoulder surgery, his CTR, and his c-spine fusion.  Unfortunately,  "never went away even after any of those surgeries.    Later in ~ March 2023, he had a fall.  Sustained L shoulder and neck injury.  Was taken to [facility].  Although he was complaining of his neck, he was told his neck was ok.  He was referred to a shoulder surgeon (Dr. Kaur), who then performed surgery on his L shoulder.  Unfortunately, only minimal relief from the surgery (likely with his pectoral muscle).  So his shoulder is still far from perfect (his deltoid never came back), but not his biggest problem at the moment.    His problem is mainly with his fingertips (left long,ring, small).  This limits his use of his left hand.     His other big problem is his lower back.  20% back, 80% left leg.  Worse: standing and walking.  (+) neurogenic claudication.   Better: sitting.   (+) shopping cart sign.    Had previously done PT for his back; no relief.    Previous Injections:  ??? - 3 prior lumbar injections, presumably epidural injections for spinal stenosis.  States that the first 2 helped his back and left lower extremity symptoms but the third injection did not     Previous EMGs:  4/25/25 - EMG bilateral UE (Dr. Christensen, [facility]).  Interpretation:  \"Abnormal study.   -- ... evidence of left C5, C6 and C7 radiculopathies, vs anterior myelopathy at the same levels. The denervation changes appreciated appear both recent/active as well as chronic.  -- ... evidence of a left median neuropathy at the wrist as seen in carpal tunnel syndrome, moderate severity.  -- NO evidence of ulnar mononeuropathy in the left upper extremity.\"    Oswestry (GISELE) Questionnaire        7/29/2025    10:10 AM   OSWESTRY DISABILITY INDEX   Count 10    Sum 22    Oswestry Score (%) 44 %        Patient-reported      GISELE 7/15/25 42%     Neck Disability Index (NDI) Questionnaire        7/29/2025    10:14 AM   Neck Disability Index (NDI)   Neck Disability Index: Count 10   NDI: Total Score = SUM (points for all 10 findings) 14   Neck " Disability in Percent = (Total Score) / 50 * 100 28 (%)      NDI 7/15/25 32%          PROMIS-10 Scores  Global Mental Health Score: (Patient-Rptd) (P) 13  Global Physical Health Score: (Patient-Rptd) (P) 11  PROMIS TOTAL - SUBSCORES: (Patient-Rptd) (P) 24    Physical Examination:    This was a virtual visit, so very limited exam could be performed.  Patient seemed alert, oriented x 3, cooperative, with coherent speech, and not in distress.  Able to respond to questions appropriately and follow instructions.    Imaging:    Cervical flex-ext x-rays 7/15/25: Shows no significant motion across operative levels C4-C7.  Interspinous distance difference:  C4-5 0.1mm  C5-6 0.6mm  C6-7 1.6mm    Lumbar flex-ext x-rays 7/15/25:  Shows grade 1 spondylolisthesis with abnormal motion L4-5.  Does not even on extension.    Cervical MRI and CT 7/23/25:  Shows postsurgical changes C4-C7.  There is some residual canal stenosis C2-C7, but without significant persistent spinal cord compression/deformation.  At the C5-6 level, there is spinal cord signal change or myelomalacia noted.  CT scan shows already solid arthrodesis C4-C7.  There also is posterior ankylosis across some levels, including the suprajacent C3-4 level.  No significant bony foraminal stenosis, particularly on the left side where patient is having continued symptoms in his fingers.    Lumbar MRI and CT 7/23/25:  Severe central bilateral lateral recess stenosis L3-S1, most severe at L4-5; bilateral foraminal stenosis L5-S1.  Spondylolisthesis L4-5.  Advanced multilevel facet arthropathy, most severe at L4-5.  Opportunistic bone density measurements sagittal and axial at L3, L4 and L5 all show reasonable readings, with no reading below 120 HU.    I went through "[employer] website, and read the report of cervical MRI performed 2/8/2024 (1 month before his cervical spine surgery).  The report mentioned severe canal stenosis C5-6, with spinal cord signal change.  Thus, the  myelomalacia had been present even prior to his cervical spine surgery in 2024.  Unfortunately, I am unable to view the images myself (the site says images unavailable).    Assessment:    60/m RHD with:  Cervical spine and Upper extremity:  1.  Persistent numbness and pain of left long, ring and small fingers (going on since working on [employer] in ~ 2022), etiology uncertain (radiculopathy vs ulnar neuropathy.  2.  EMG (4/25/25 Dr. Christensen) showing left C5, C6 and C7 radiculopathies versus anterior myelopathy; moderate left median neuropathy; but NO evidence of ulnar neuropathy.  3.  S/p 3-level ACDF with plate fixation C4-C7 (3/8/24 Marek, [facility]), now with solid arthrodesis on CT (7/23/25); performed for left C5-C7 radiculopathy with preop 2/5 deltoid strength; however, per patient, no benefit from surgery.  4.  S/p Left CTR (2/12/24 Jonathon, [facility]); per patient, no benefit.  5.  S/p left shoulder rotator cuff repair (Dr. Kaur, [facility]).  No improvement in left shoulder strength or mobility  Lumbar spine:  1.  Gr. 1 degenerative spondylolisthesis with instability L4-5.  2.  Severe multilevel lumbar spinal stenosis L3-S1, with neurogenic claudication.  3.  Lower lumbar flatback deformity.  4.  Chronic left L5 radiculopathy.  Other:  1.  Class III obesity (BMI 42.54 as of 7/29/25).      Plan:    Had good long discussion with patient.  Regarding his neck, it is less guaranteed that further surgery is likely to be of benefit.  We could still see some degree of canal stenosis, and advanced degenerative changes in his neck.  A consideration could be made for posterior instrumented spinal fusion C2-T2, with multilevel laminectomies with the goal of creating more room for his spinal cord.  However, in the presence of cord signal change (myelomalacia), that seems to have already been present even prior to his cervical spine fusion surgery in 2024, and the fact that that surgery did not seem to have conferred  significant clinical benefit even though advanced imaging shows improved room in his canal, would suggest that there is a possibility that his symptoms would not improve even after such big surgery.    He himself seems convinced that his ongoing finger issues are not coming from his neck.  For 1 thing, he said they had already been present even before he injured his neck in a fall.  Second, as mentioned above, he did not experience any improvement of finger symptoms after his neck fusion surgery.  Third, he reports that movement of his hand seems to aggravate or worsen his finger symptoms; rather than movement of his neck.  For this reason, we discussed the option of referral to our hand surgeons for a second opinion regarding his ongoing finger symptoms.    Regarding his lower back, the situation is quite different.  We see imaging evidence of severe pathology in his lumbar spine that is likely to benefit from surgery.  This is in the form of severe stenosis with pinching of the nerves from L3-S1.  He also has spondylolisthesis with instability at L4-5.  At same time, his lower lumbar spine also has flatback deformity.  All these conditions could be addressed with surgery in the form of 3 level fusion and decompression L3-S1, with pelvic fixation.  I discussed with him the procedure, rationale, risks, benefits and alternatives.  We discussed anticipated postsurgical course and restrictions.  We discussed bone graft options, agreed to use of local autograft, infuse BMP and allograft (spinal graft technologies, [employer]).  The patient does not have any untreated, underlying mental health conditions or issues which are a major contributor to their chronic pain.  Nonsmoker.  Because of his reasonable opportunistic BMD measurement, his relatively young age (60 years), no history of fractures-I do not think he needs to undergo DEXA scan necessarily at this point.  From a medical standpoint, he has class III obesity,  takes medication for hypertension and hypercholesterolemia.  However, per patient, he is not diabetic, has no previous cardiac events, not on blood thinners, no pulmonary conditions.    - Retrieve cervical MRI images and report done at [facility] on 2/8/24, and upload to PACS.  - Case request: PISF L3-pelvis; 3-level TLIF-SPO L3-S1; use of Infuse BMP Large kit and allograft.  - Refer to Hand surgery team for 2nd opinion re chronic/persistent pain and numbness affecting left long/ring/small fingers, with history of left CTR and 3-level ACDF C4-C7.  EMG shows C4-C7 radiculopathy and moderate median neuropathy; but no evidence of ulnar neuropathy.    I personally spent >40 minutes on the date of the encounter doing chart review/review of outside records/review of test results/interpretation of tests/patient visit/documentation/discussion with other provider(s)/discussion with patient and family.    Lionel Bartlett MD    Orthopaedic Spine Surgery  Dept Orthopaedic Surgery, [facility]  [phone] office, [phone] pager  [email or web address]      Again, thank you for allowing me to participate in the care of your patient.        Sincerely,        Lionel Bartlett MD    Electronically signed

## 2025-07-29 NOTE — PROGRESS NOTES
"VIRTUAL VISIT:  Patient is evaluated today via billable virtual visit.    The patient has been notified of following:   \"This virtual visit will be conducted via a call between you and your physician/provider. We have found that certain health care needs can be provided without the need for an in-person physical exam.  This service lets us provide the care you need with a virtual conversation.  If a prescription is necessary we can send it directly to your pharmacy.  If lab work is needed we can place an order for that and you can then stop by our lab to have the test done at a later time.  If during the course of the call the physician/provider feels a virtual visit is not appropriate, you will not be charged for this service.\"     Spine and Related Surgical Hx:  05/04/2023 - Left shoulder RCR (supraspinatus, subscapularis), subacromial decompression and bursectomy, biceps tenotomy (Dr. Ladarius Kaur, [facility], [facility]).  02/12/2024 - Left CTR (Dr. Holt, [facility]).  03/08/2024 - 3-level ACDF with plate fixation C4-C7; use of Magnetos allograft (Dr. Mj Jara, [facility]) for multilevel spondylosis; left C5-C7 radiculopathy with 2/5 left deltoid strength.  [Implants: NuVasive anterior cervical plate; Cohere porous PEEK cages 16 x 14 mm].      Physician has received verbal consent for a Virtual Visit from the patient.  Platform used:  [email or web address] Video.  Sent video link; patient tried joining, but we could not see each other.  Ultimately, we completed the visit via telephone.  Time:  3:01pm to 3:44pm  Originating Location (pt. Location): Home  Distant Location (provider location):  [facility]     Chief Complaint   Patient presents with    RECHECK       Last Visit Date: 7/15/25  Previous Impression:  59 y/o RHD M  1.  Left middle, ring, pinky finger numbness, throbbing, pain for multiple years.  Unchanged with all of the below surgeries.  EMG performed on 4/25/2025 which " demonstrated electrodiagnostic evidence of left C5, C6 and C7 radiculopathies, vs anterior myelopathy at the same levels. The denervation changes appreciated appear both recent/active as well as chronic.  2.  Low back and left lower extremity L5 radiculopathy present for multiple years.  Reports 3 prior lumbar injections, first 2 helped but the third did not   3.  16 mo s/p C4-7 ACDF (Dr. Mj Jara, [facility]) for multilevel spondylosis; left C5-C7 radiculopathy with 2/5 left deltoid strength (3/8/2024).  No improvement in preoperative symptoms  4.  17 mo s/p L CTR (Dr. Holt, [facility]). No improvement in left hand symptoms.   5.  S/p left shoulder rotator cuff repair (Dr. Kaur, [facility]).  No improvement in left shoulder strength or mobility  Previous Plan:  - Cervical CT scan to evaluate for fusion and residual foraminal stenosis  - Cervical MRI to evaluate for nerve root compression  - Lumbar CT and MRI to evaluate for lumbar stenosis  - Retrieve operative report from Dr. Holt at [facility] who performed the left hand carpal tunnel release.  - Obtain flexion and extension x-rays of the cervical and lumbar spine prior to leaving clinic today  In-person or virtual RTC after above, to review and discuss further options, including possible injections vs. Therapy vs. further operative treatment.      S>  60 year old male, here to review cervical and lumbar  CT and MRI, and flex-ext x-rays, and discuss further options.      Had been working on [employer] for a couple of months in 2022/23; that was when his finger problems started (left long, ring, small fingers).  Thus, the finger problems preceded his shoulder surgery, his CTR, and his c-spine fusion.  Unfortunately, never went away even after any of those surgeries.    Later in ~ March 2023, he had a fall.  Sustained L shoulder and neck injury.  Was taken to [facility].  Although he was complaining of his neck, he was told his neck was ok.  He was  "referred to a shoulder surgeon (Dr. Kaur), who then performed surgery on his L shoulder.  Unfortunately, only minimal relief from the surgery (likely with his pectoral muscle).  So his shoulder is still far from perfect (his deltoid never came back), but not his biggest problem at the moment.    His problem is mainly with his fingertips (left long,ring, small).  This limits his use of his left hand.     His other big problem is his lower back.  20% back, 80% left leg.  Worse: standing and walking.  (+) neurogenic claudication.   Better: sitting.   (+) shopping cart sign.    Had previously done PT for his back; no relief.    Previous Injections:  ??? - 3 prior lumbar injections, presumably epidural injections for spinal stenosis.  States that the first 2 helped his back and left lower extremity symptoms but the third injection did not     Previous EMGs:  4/25/25 - EMG bilateral UE (Dr. Christensen, [facility]).  Interpretation:  \"Abnormal study.   -- ... evidence of left C5, C6 and C7 radiculopathies, vs anterior myelopathy at the same levels. The denervation changes appreciated appear both recent/active as well as chronic.  -- ... evidence of a left median neuropathy at the wrist as seen in carpal tunnel syndrome, moderate severity.  -- NO evidence of ulnar mononeuropathy in the left upper extremity.\"    Oswestry (GISELE) Questionnaire        7/29/2025    10:10 AM   OSWESTRY DISABILITY INDEX   Count 10    Sum 22    Oswestry Score (%) 44 %        Patient-reported      GISELE 7/15/25 42%     Neck Disability Index (NDI) Questionnaire        7/29/2025    10:14 AM   Neck Disability Index (NDI)   Neck Disability Index: Count 10   NDI: Total Score = SUM (points for all 10 findings) 14   Neck Disability in Percent = (Total Score) / 50 * 100 28 (%)      NDI 7/15/25 32%          PROMIS-10 Scores  Global Mental Health Score: (Patient-Rptd) (P) 13  Global Physical Health Score: (Patient-Rptd) (P) 11  PROMIS TOTAL - SUBSCORES: " (Patient-Rptd) (P) 24    Physical Examination:    This was a virtual visit, so very limited exam could be performed.  Patient seemed alert, oriented x 3, cooperative, with coherent speech, and not in distress.  Able to respond to questions appropriately and follow instructions.    Imaging:    Cervical flex-ext x-rays 7/15/25: Shows no significant motion across operative levels C4-C7.  Interspinous distance difference:  C4-5 0.1mm  C5-6 0.6mm  C6-7 1.6mm    Lumbar flex-ext x-rays 7/15/25:  Shows grade 1 spondylolisthesis with abnormal motion L4-5.  Does not even on extension.    Cervical MRI and CT 7/23/25:  Shows postsurgical changes C4-C7.  There is some residual canal stenosis C2-C7, but without significant persistent spinal cord compression/deformation.  At the C5-6 level, there is spinal cord signal change or myelomalacia noted.  CT scan shows already solid arthrodesis C4-C7.  There also is posterior ankylosis across some levels, including the suprajacent C3-4 level.  No significant bony foraminal stenosis, particularly on the left side where patient is having continued symptoms in his fingers.    Lumbar MRI and CT 7/23/25:  Severe central bilateral lateral recess stenosis L3-S1, most severe at L4-5; bilateral foraminal stenosis L5-S1.  Spondylolisthesis L4-5.  Advanced multilevel facet arthropathy, most severe at L4-5.  Opportunistic bone density measurements sagittal and axial at L3, L4 and L5 all show reasonable readings, with no reading below 120 HU.    I went through [employer] website, and read the report of cervical MRI performed 2/8/2024 (1 month before his cervical spine surgery).  The report mentioned severe canal stenosis C5-6, with spinal cord signal change.  Thus, the myelomalacia had been present even prior to his cervical spine surgery in 2024.  Unfortunately, I am unable to view the images myself (the site says images unavailable).    Assessment:    60/m RHD with:  Cervical spine and Upper  extremity:  1.  Persistent numbness and pain of left long, ring and small fingers (going on since working on [employer] in ~ 2022), etiology uncertain (radiculopathy vs ulnar neuropathy.  2.  EMG (4/25/25 Dr. Christensen) showing left C5, C6 and C7 radiculopathies versus anterior myelopathy; moderate left median neuropathy; but NO evidence of ulnar neuropathy.  3.  S/p 3-level ACDF with plate fixation C4-C7 (3/8/24 Marek, [facility]), now with solid arthrodesis on CT (7/23/25); performed for left C5-C7 radiculopathy with preop 2/5 deltoid strength; however, per patient, no benefit from surgery.  4.  S/p Left CTR (2/12/24 Jonathon, [facility]); per patient, no benefit.  5.  S/p left shoulder rotator cuff repair (Dr. Kaur, [facility]).  No improvement in left shoulder strength or mobility  Lumbar spine:  1.  Gr. 1 degenerative spondylolisthesis with instability L4-5.  2.  Severe multilevel lumbar spinal stenosis L3-S1, with neurogenic claudication.  3.  Lower lumbar flatback deformity.  4.  Chronic left L5 radiculopathy.  Other:  1.  Class III obesity (BMI 42.54 as of 7/29/25).      Plan:    Had good long discussion with patient.  Regarding his neck, it is less guaranteed that further surgery is likely to be of benefit.  We could still see some degree of canal stenosis, and advanced degenerative changes in his neck.  A consideration could be made for posterior instrumented spinal fusion C2-T2, with multilevel laminectomies with the goal of creating more room for his spinal cord.  However, in the presence of cord signal change (myelomalacia), that seems to have already been present even prior to his cervical spine fusion surgery in 2024, and the fact that that surgery did not seem to have conferred significant clinical benefit even though advanced imaging shows improved room in his canal, would suggest that there is a possibility that his symptoms would not improve even after such big surgery.    He himself seems convinced that  his ongoing finger issues are not coming from his neck.  For 1 thing, he said they had already been present even before he injured his neck in a fall.  Second, as mentioned above, he did not experience any improvement of finger symptoms after his neck fusion surgery.  Third, he reports that movement of his hand seems to aggravate or worsen his finger symptoms; rather than movement of his neck.  For this reason, we discussed the option of referral to our hand surgeons for a second opinion regarding his ongoing finger symptoms.    Regarding his lower back, the situation is quite different.  We see imaging evidence of severe pathology in his lumbar spine that is likely to benefit from surgery.  This is in the form of severe stenosis with pinching of the nerves from L3-S1.  He also has spondylolisthesis with instability at L4-5.  At same time, his lower lumbar spine also has flatback deformity.  All these conditions could be addressed with surgery in the form of 3 level fusion and decompression L3-S1, with pelvic fixation.  I discussed with him the procedure, rationale, risks, benefits and alternatives.  We discussed anticipated postsurgical course and restrictions.  We discussed bone graft options, agreed to use of local autograft, infuse BMP and allograft (spinal graft technologies, [employer]).  The patient does not have any untreated, underlying mental health conditions or issues which are a major contributor to their chronic pain.  Nonsmoker.  Because of his reasonable opportunistic BMD measurement, his relatively young age (60 years), no history of fractures-I do not think he needs to undergo DEXA scan necessarily at this point.  From a medical standpoint, he has class III obesity, takes medication for hypertension and hypercholesterolemia.  However, per patient, he is not diabetic, has no previous cardiac events, not on blood thinners, no pulmonary conditions.    - Retrieve cervical MRI images and report done at  [facility] on 2/8/24, and upload to PACS.  - Case request: PISF L3-pelvis; 3-level TLIF-SPO L3-S1; use of Infuse BMP Large kit and allograft.  - Refer to Hand surgery team for 2nd opinion re chronic/persistent pain and numbness affecting left long/ring/small fingers, with history of left CTR and 3-level ACDF C4-C7.  EMG shows C4-C7 radiculopathy and moderate median neuropathy; but no evidence of ulnar neuropathy.    I personally spent >40 minutes on the date of the encounter doing chart review/review of outside records/review of test results/interpretation of tests/patient visit/documentation/discussion with other provider(s)/discussion with patient and family.    Lionel Bartlett MD    Orthopaedic Spine Surgery  Dept Orthopaedic Surgery, [facility]  [phone] office, [phone] pager  [email or web address]

## 2025-07-30 ENCOUNTER — VIRTUAL VISIT (OUTPATIENT)
Facility: CLINIC | Age: 60
End: 2025-07-30
Attending: INTERNAL MEDICINE
Payer: COMMERCIAL

## 2025-07-30 ENCOUNTER — PATIENT OUTREACH (OUTPATIENT)
Dept: CARE COORDINATION | Facility: CLINIC | Age: 60
End: 2025-07-30
Payer: COMMERCIAL

## 2025-07-30 ENCOUNTER — TELEPHONE (OUTPATIENT)
Dept: ORTHOPEDICS | Facility: CLINIC | Age: 60
End: 2025-07-30

## 2025-07-30 NOTE — PATIENT INSTRUCTIONS
"Recommendations from today's MTM visit:                                                    MTM (medication therapy management) is a service provided by a clinical pharmacist designed to help you get the most of out of your medicines.      We discussed initiation of Wegovy today.  For now, we will not place orders and revisit the idea of starting the medication in October as we have scheduled.     In the meantime, please let me know if you have any questions and continue to work on reduction in alcohol intake as we discussed.    It was great speaking with you today.  I value your experience and would be very thankful for your time in providing feedback in our clinic survey. In the next few days, you may receive an email or text message from [facility] with a link to a survey related to your  clinical pharmacist.\"     To schedule another MTM appointment, please call the clinic directly or you may call the MTM scheduling line at [phone].    My Clinical Pharmacist's contact information:                                                      Please feel free to contact me with any questions or concerns you have.      Harsha Patel, PharmD, BCACP  Medication Therapy Management Pharmacist  [facility]    "

## 2025-07-30 NOTE — PROGRESS NOTES
Medication Therapy Management (MTM) Encounter    ASSESSMENT:                            Medication Adherence/Access: No issues identified.    Hyperlipidemia /CAD: Stable.  Tolerating current medication regimen without adverse effects.  No noted symptoms today.  Would benefit from GLP-1 agonist therapy as below.     Weight Management: Unchanged.  Patient is an excellent candidate for initiation of GLP-1 agonist therapy given his history of coronary artery disease.  We reviewed mechanism, safety, administration, adverse effects, monitoring with use of GLP-1 agonist.  He does currently consume alcohol and is willing to reduce volume of alcohol intake to accommodate safety of GLP-1 agonist therapy.  He would like to begin to work on this before initiating the process of accessing the medication.  We will review again in 1 to 2 months.  He otherwise does not have any overt contraindications to therapy.    PLAN:                            We discussed initiation of Wegovy today.  For now, we will not place orders and revisit the idea of starting the medication in October as we have scheduled.    In the meantime, please let me know if you have any questions and continue to work on reduction in alcohol intake as we discussed.    SUBJECTIVE/OBJECTIVE:                          Dino Best is a 60 year old male seen for an initial visit. He was referred to me from Larissa Butler .      Reason for visit: GLP-1 agonist consult .    Allergies/ADRs: Reviewed in chart  Past Medical History: Reviewed in chart  Tobacco: He reports that he has quit smoking. His smoking use included cigarettes. He has never used smokeless tobacco.  Alcohol: 5x week, 3-4 beers, up to 6 beers or liquor or wine. Would be willing to cut back.   Medication Adherence/Access: no issues reported.    Hyperlipidemia /CAD  Aspirin 81 mg once daily   Ezetimibe 10 mg once daily   Lisinopril 20 mg once daily   Metoprolol succinate 25 mg once daily    Rosuvastatin 40 mg once daily   NTG 0.4 mg SL AS NEEDED     Had discussed weight loss with Dr. Butler previously. No myalgias. Has not required NTG recently. Hoping to start a weight loss journey, but has been limited in exercise due to back pain limiting physical activity. Has a back procedure later this year. He feels he's been gaining weight as a result of physical inactivity.     Blood pressures: avg 130/85, no dizziness/light headedness.      Weight Management   Has intent to cut back on alcohol, has had success with weight loss with cutting back or eliminating alcohol. Would want to quit alcohol 30x day before starting medication. He is     Water intake: Feels he drinks plenty of water. 2 x 12 oz every morning and throughout the day   Baseline GI: Deals with constipation regularly.   Negative history of pancreatitis, medullary thyroid cancer and multiple endocrine neoplasia type 2.  Negative gall bladder history.           ----------------      I spent 30 minutes with this patient today. I offer these suggestions for consideration by Larissa Butler . A copy of the visit note was provided to the patient's provider(s).    A summary of these recommendations was sent via [employer].    Harsha Patel, PharmD, BCACP  Medication Therapy Management Pharmacist  [facility]     Telemedicine Visit Details  The patient's medications can be safely assessed via a telemedicine encounter.  Type of service:  Telephone visit  Originating Location (pt. Location): Home    Distant Location (provider location):  Off-site  Start Time: 830am  End Time: 900am     Medication Therapy Recommendations  No medication therapy recommendations to display

## 2025-08-05 ENCOUNTER — OFFICE VISIT (OUTPATIENT)
Dept: DERMATOLOGY | Facility: CLINIC | Age: 60
End: 2025-08-05
Payer: COMMERCIAL

## 2025-08-05 ENCOUNTER — TELEPHONE (OUTPATIENT)
Dept: DERMATOLOGY | Facility: CLINIC | Age: 60
End: 2025-08-05

## 2025-08-05 ENCOUNTER — OFFICE VISIT (OUTPATIENT)
Dept: CARDIOLOGY | Facility: CLINIC | Age: 60
End: 2025-08-05
Attending: NURSE PRACTITIONER
Payer: COMMERCIAL

## 2025-08-05 VITALS
SYSTOLIC BLOOD PRESSURE: 159 MMHG | OXYGEN SATURATION: 98 % | HEART RATE: 56 BPM | WEIGHT: 311.4 LBS | DIASTOLIC BLOOD PRESSURE: 106 MMHG | BODY MASS INDEX: 43.43 KG/M2

## 2025-08-05 DIAGNOSIS — E78.5 HYPERLIPIDEMIA, UNSPECIFIED HYPERLIPIDEMIA TYPE: ICD-10-CM

## 2025-08-05 DIAGNOSIS — I25.119 CORONARY ARTERY DISEASE INVOLVING NATIVE CORONARY ARTERY OF NATIVE HEART WITH ANGINA PECTORIS: ICD-10-CM

## 2025-08-05 DIAGNOSIS — I10 BENIGN ESSENTIAL HYPERTENSION: ICD-10-CM

## 2025-08-05 DIAGNOSIS — L40.50 PSORIATIC ARTHRITIS (H): ICD-10-CM

## 2025-08-05 DIAGNOSIS — L40.9 PSORIASIS: Primary | ICD-10-CM

## 2025-08-05 LAB
ALBUMIN SERPL BCG-MCNC: 4.4 G/DL (ref 3.5–5.2)
ALP SERPL-CCNC: 55 U/L (ref 40–150)
ALT SERPL W P-5'-P-CCNC: 23 U/L (ref 0–70)
ANION GAP SERPL CALCULATED.3IONS-SCNC: 16 MMOL/L (ref 7–15)
AST SERPL W P-5'-P-CCNC: 28 U/L (ref 0–45)
BILIRUB SERPL-MCNC: 0.6 MG/DL
BUN SERPL-MCNC: 11 MG/DL (ref 8–23)
CALCIUM SERPL-MCNC: 9.5 MG/DL (ref 8.8–10.4)
CHLORIDE SERPL-SCNC: 102 MMOL/L (ref 98–107)
CREAT SERPL-MCNC: 1.01 MG/DL (ref 0.67–1.17)
EGFRCR SERPLBLD CKD-EPI 2021: 86 ML/MIN/1.73M2
ERYTHROCYTE [DISTWIDTH] IN BLOOD BY AUTOMATED COUNT: 13 % (ref 10–15)
GLUCOSE SERPL-MCNC: 152 MG/DL (ref 70–99)
HCO3 SERPL-SCNC: 22 MMOL/L (ref 22–29)
HCT VFR BLD AUTO: 47.4 % (ref 40–53)
HGB BLD-MCNC: 16.3 G/DL (ref 13.3–17.7)
MCH RBC QN AUTO: 30.8 PG (ref 26.5–33)
MCHC RBC AUTO-ENTMCNC: 34.4 G/DL (ref 31.5–36.5)
MCV RBC AUTO: 89 FL (ref 78–100)
PLATELET # BLD AUTO: 267 10E3/UL (ref 150–450)
POTASSIUM SERPL-SCNC: 4.3 MMOL/L (ref 3.4–5.3)
PROT SERPL-MCNC: 7.1 G/DL (ref 6.4–8.3)
RBC # BLD AUTO: 5.3 10E6/UL (ref 4.4–5.9)
SODIUM SERPL-SCNC: 140 MMOL/L (ref 135–145)
WBC # BLD AUTO: 9.1 10E3/UL (ref 4–11)

## 2025-08-05 PROCEDURE — G0463 HOSPITAL OUTPT CLINIC VISIT: HCPCS | Performed by: NURSE PRACTITIONER

## 2025-08-05 PROCEDURE — 3080F DIAST BP >= 90 MM HG: CPT | Performed by: NURSE PRACTITIONER

## 2025-08-05 PROCEDURE — 86803 HEPATITIS C AB TEST: CPT | Performed by: DERMATOLOGY

## 2025-08-05 PROCEDURE — 86704 HEP B CORE ANTIBODY TOTAL: CPT | Performed by: DERMATOLOGY

## 2025-08-05 PROCEDURE — 85027 COMPLETE CBC AUTOMATED: CPT | Performed by: DERMATOLOGY

## 2025-08-05 PROCEDURE — 36415 COLL VENOUS BLD VENIPUNCTURE: CPT | Performed by: DERMATOLOGY

## 2025-08-05 PROCEDURE — 3077F SYST BP >= 140 MM HG: CPT | Performed by: NURSE PRACTITIONER

## 2025-08-05 PROCEDURE — 1126F AMNT PAIN NOTED NONE PRSNT: CPT | Performed by: NURSE PRACTITIONER

## 2025-08-05 PROCEDURE — 80053 COMPREHEN METABOLIC PANEL: CPT | Performed by: DERMATOLOGY

## 2025-08-05 PROCEDURE — 99203 OFFICE O/P NEW LOW 30 MIN: CPT | Performed by: DERMATOLOGY

## 2025-08-05 PROCEDURE — 99214 OFFICE O/P EST MOD 30 MIN: CPT | Performed by: NURSE PRACTITIONER

## 2025-08-05 RX ORDER — LISINOPRIL 40 MG/1
40 TABLET ORAL DAILY
Qty: 90 TABLET | Refills: 3 | Status: SHIPPED | OUTPATIENT
Start: 2025-08-05

## 2025-08-05 RX ORDER — BETAMETHASONE DIPROPIONATE 0.5 MG/G
CREAM TOPICAL
Qty: 300 G | Refills: 3 | Status: SHIPPED | OUTPATIENT
Start: 2025-08-05

## 2025-08-05 ASSESSMENT — PAIN SCALES - GENERAL: PAINLEVEL_OUTOF10: NO PAIN (0)

## 2025-08-06 ENCOUNTER — RESULTS FOLLOW-UP (OUTPATIENT)
Dept: DERMATOLOGY | Facility: CLINIC | Age: 60
End: 2025-08-06

## 2025-08-06 ENCOUNTER — HOSPITAL ENCOUNTER (OUTPATIENT)
Dept: MRI IMAGING | Facility: CLINIC | Age: 60
Discharge: HOME | End: 2025-08-06
Attending: INTERNAL MEDICINE
Payer: COMMERCIAL

## 2025-08-06 DIAGNOSIS — G47.33 OSA (OBSTRUCTIVE SLEEP APNEA): ICD-10-CM

## 2025-08-06 DIAGNOSIS — I71.21 ANEURYSM OF ASCENDING AORTA WITHOUT RUPTURE: ICD-10-CM

## 2025-08-06 DIAGNOSIS — I25.119 CORONARY ARTERY DISEASE INVOLVING NATIVE CORONARY ARTERY OF NATIVE HEART WITH ANGINA PECTORIS: ICD-10-CM

## 2025-08-06 LAB
HBV CORE AB SERPL QL IA: NONREACTIVE
HCV AB SERPL QL IA: NONREACTIVE

## 2025-08-06 PROCEDURE — 71555 MRI ANGIO CHEST W OR W/O DYE: CPT

## 2025-08-06 PROCEDURE — A9585 GADOBUTROL INJECTION: HCPCS | Performed by: INTERNAL MEDICINE

## 2025-08-06 PROCEDURE — 255N000002 HC RX 255 OP 636: Performed by: INTERNAL MEDICINE

## 2025-08-06 RX ORDER — GADOBUTROL 604.72 MG/ML
10 INJECTION INTRAVENOUS ONCE
Status: COMPLETED | OUTPATIENT
Start: 2025-08-06 | End: 2025-08-06

## 2025-08-06 RX ADMIN — GADOBUTROL 10 ML: 604.72 INJECTION INTRAVENOUS at 17:38

## 2025-08-07 LAB
QUANTIFERON MITOGEN: 10 IU/ML
QUANTIFERON NIL TUBE: 0.1 IU/ML
QUANTIFERON TB1 TUBE: 0.2 IU/ML
QUANTIFERON TB2 TUBE: 0.16

## 2025-08-11 ENCOUNTER — VIRTUAL VISIT (OUTPATIENT)
Dept: PHARMACY | Facility: CLINIC | Age: 60
End: 2025-08-11
Attending: DERMATOLOGY
Payer: COMMERCIAL

## 2025-08-11 DIAGNOSIS — L40.9 PSORIASIS: Primary | ICD-10-CM

## 2025-08-12 ENCOUNTER — ANCILLARY PROCEDURE (OUTPATIENT)
Dept: GENERAL RADIOLOGY | Facility: CLINIC | Age: 60
End: 2025-08-12
Attending: STUDENT IN AN ORGANIZED HEALTH CARE EDUCATION/TRAINING PROGRAM
Payer: COMMERCIAL

## 2025-08-12 ENCOUNTER — PRE VISIT (OUTPATIENT)
Dept: ORTHOPEDICS | Facility: CLINIC | Age: 60
End: 2025-08-12

## 2025-08-12 ENCOUNTER — OFFICE VISIT (OUTPATIENT)
Dept: ORTHOPEDICS | Facility: CLINIC | Age: 60
End: 2025-08-12
Attending: ORTHOPAEDIC SURGERY
Payer: COMMERCIAL

## 2025-08-12 VITALS — HEIGHT: 71 IN | BODY MASS INDEX: 42.7 KG/M2 | WEIGHT: 305 LBS

## 2025-08-12 DIAGNOSIS — M25.522 ELBOW PAIN, LEFT: Primary | ICD-10-CM

## 2025-08-12 DIAGNOSIS — Z98.1 S/P CERVICAL SPINAL FUSION: ICD-10-CM

## 2025-08-12 DIAGNOSIS — M40.37 FLATBACK SYNDROME OF LUMBOSACRAL REGION: ICD-10-CM

## 2025-08-12 DIAGNOSIS — M48.062 LUMBAR STENOSIS WITH NEUROGENIC CLAUDICATION: ICD-10-CM

## 2025-08-12 DIAGNOSIS — M43.16 SPONDYLOLISTHESIS OF LUMBAR REGION: ICD-10-CM

## 2025-08-12 DIAGNOSIS — M25.522 ELBOW PAIN, LEFT: ICD-10-CM

## 2025-08-12 DIAGNOSIS — G56.02 CARPAL TUNNEL SYNDROME OF LEFT WRIST: ICD-10-CM

## 2025-08-12 PROCEDURE — 73080 X-RAY EXAM OF ELBOW: CPT | Mod: LT | Performed by: RADIOLOGY

## 2025-08-12 PROCEDURE — 99213 OFFICE O/P EST LOW 20 MIN: CPT | Mod: GC | Performed by: STUDENT IN AN ORGANIZED HEALTH CARE EDUCATION/TRAINING PROGRAM

## 2025-08-19 ENCOUNTER — MYC MEDICAL ADVICE (OUTPATIENT)
Dept: CARDIOLOGY | Facility: CLINIC | Age: 60
End: 2025-08-19
Payer: COMMERCIAL

## 2025-09-02 ENCOUNTER — APPOINTMENT (OUTPATIENT)
Dept: URBAN - METROPOLITAN AREA CLINIC 252 | Age: 60
Setting detail: DERMATOLOGY
End: 2025-09-02

## 2025-09-02 VITALS — RESPIRATION RATE: 16 BRPM | WEIGHT: 275 LBS | HEIGHT: 71 IN

## 2025-09-02 DIAGNOSIS — L40.0 PSORIASIS VULGARIS: ICD-10-CM

## 2025-09-02 PROCEDURE — OTHER PRESCRIPTION MEDICATION MANAGEMENT: OTHER

## 2025-09-02 PROCEDURE — OTHER ADDITIONAL NOTES: OTHER

## 2025-09-02 PROCEDURE — OTHER COUNSELING: OTHER

## 2025-09-02 PROCEDURE — OTHER PRESCRIPTION: OTHER

## 2025-09-02 RX ORDER — TRIAMCINOLONE ACETONIDE 1 MG/G
CREAM TOPICAL
Qty: 454 | Refills: 2 | Status: ERX | COMMUNITY
Start: 2025-09-02

## 2025-09-02 ASSESSMENT — LOCATION ZONE DERM
LOCATION ZONE: TRUNK
LOCATION ZONE: ARM
LOCATION ZONE: SCALP
LOCATION ZONE: LEG

## 2025-09-02 ASSESSMENT — LOCATION DETAILED DESCRIPTION DERM
LOCATION DETAILED: PERIUMBILICAL SKIN
LOCATION DETAILED: RIGHT SUPERIOR MEDIAL UPPER BACK
LOCATION DETAILED: MID-OCCIPITAL SCALP
LOCATION DETAILED: LEFT SUPERIOR MEDIAL MIDBACK
LOCATION DETAILED: RIGHT POPLITEAL SKIN
LOCATION DETAILED: RIGHT ANTERIOR DISTAL UPPER ARM
LOCATION DETAILED: RIGHT DISTAL CALF
LOCATION DETAILED: LEFT POPLITEAL SKIN
LOCATION DETAILED: LEFT DISTAL CALF
LOCATION DETAILED: LEFT ANTERIOR DISTAL UPPER ARM

## 2025-09-02 ASSESSMENT — BSA PSORIASIS: % BODY COVERED IN PSORIASIS: 50

## 2025-09-02 ASSESSMENT — LOCATION SIMPLE DESCRIPTION DERM
LOCATION SIMPLE: POSTERIOR SCALP
LOCATION SIMPLE: RIGHT UPPER ARM
LOCATION SIMPLE: LEFT CALF
LOCATION SIMPLE: ABDOMEN
LOCATION SIMPLE: RIGHT POPLITEAL SKIN
LOCATION SIMPLE: LEFT UPPER ARM
LOCATION SIMPLE: LEFT POPLITEAL SKIN
LOCATION SIMPLE: RIGHT CALF
LOCATION SIMPLE: RIGHT UPPER BACK
LOCATION SIMPLE: LEFT LOWER BACK

## 2025-09-02 ASSESSMENT — ITCH NUMERIC RATING SCALE: HOW SEVERE IS YOUR ITCHING?: 5

## (undated) DEVICE — RX SURGIFLO HEMOSTATIC MATRIX W/THROMBIN 8ML 2994

## (undated) DEVICE — DECANTER BAG 2002S

## (undated) DEVICE — TAPE DURAPORE 3" SILK 1538-3

## (undated) DEVICE — LINEN TOWEL PACK X5 5464

## (undated) DEVICE — Device

## (undated) DEVICE — DRAPE COVER C-ARM SEAMLESS SNAP-KAP 03-KP26 LATEX FREE

## (undated) DEVICE — BLADE KNIFE SURG 15 371115

## (undated) DEVICE — ESU GROUND PAD UNIVERSAL W/O CORD

## (undated) DEVICE — DRAPE STERI TOWEL LG 1010

## (undated) DEVICE — SU SILK 3-0 FS-1 18" 684H

## (undated) DEVICE — DRSG DRAIN 4X4" 7086

## (undated) DEVICE — DRAIN JACKSON PRATT RESERVOIR 100ML SU130-1305

## (undated) DEVICE — SU VICRYL 3-0 SH 27" UND J416H

## (undated) DEVICE — ESU ELEC BLADE 2.75" COATED/INSULATED E1455

## (undated) DEVICE — DRAIN JACKSON PRATT 10FR ROUND SU130-1321

## (undated) DEVICE — SPONGE SURGIFOAM 100 1974

## (undated) DEVICE — SOL WATER IRRIG 1000ML BOTTLE 2F7114

## (undated) DEVICE — DRSG TEGADERM 4X4 3/4" 1626

## (undated) DEVICE — ANTIFOG SOLUTION W/FOAM PAD 31142527

## (undated) DEVICE — MANIFOLD NEPTUNE 4 PORT 700-20

## (undated) DEVICE — NDL SPINAL 18GA 3.5" 405184

## (undated) DEVICE — SUCTION FRAZIER 12FR W/OBTURATOR 33120

## (undated) DEVICE — TOOL DISSECT MIDAS MR8 12CM SP MATCH SYM-TRI MR8-SP12MH30T

## (undated) DEVICE — PIN DISTRACTION ANCHOR FOR SCR 14MM MDS9091414

## (undated) DEVICE — DRSG TEGADERM 2 1/2X 2 3/4"

## (undated) DEVICE — SU VICRYL 2-0 CT-2 27" UND J269H

## (undated) DEVICE — STRAP POSITIONING VELCRO 13' CERVICAL HARNESS 920877

## (undated) DEVICE — SU MONOCRYL 4-0 PS-2 18" UND Y496G

## (undated) DEVICE — DRAPE MAYO STAND 23X54 8337

## (undated) DEVICE — DRAPE MICROSCOPE LEICA 54X150" AR8033650

## (undated) DEVICE — SPONGE COTTONOID 1/2X3" 80-1407

## (undated) DEVICE — SPONGE KITTNER 30-101

## (undated) DEVICE — GLOVE BIOGEL PI ULTRATOUCH SZ 6.5 41165

## (undated) DEVICE — GLOVE BIOGEL PI MICRO INDICATOR UNDERGLOVE SZ 7.0 48970

## (undated) DEVICE — PACK SPINE SM CUSTOM SNE15SSFSK

## (undated) DEVICE — PREP CHLORAPREP W/ORANGE TINT 10.5ML 260715

## (undated) DEVICE — IMM COLLAR CERVICAL MED UNIVERSAL 3X24" 79-83500

## (undated) DEVICE — TUBING SUCTION SOFT 20'X3/16" 0036570

## (undated) RX ORDER — HYDROMORPHONE HCL IN WATER/PF 6 MG/30 ML
PATIENT CONTROLLED ANALGESIA SYRINGE INTRAVENOUS
Status: DISPENSED
Start: 2024-03-08

## (undated) RX ORDER — AMINOPHYLLINE 25 MG/ML
INJECTION, SOLUTION INTRAVENOUS
Status: DISPENSED
Start: 2025-07-21

## (undated) RX ORDER — HYDROMORPHONE HYDROCHLORIDE 1 MG/ML
INJECTION, SOLUTION INTRAMUSCULAR; INTRAVENOUS; SUBCUTANEOUS
Status: DISPENSED
Start: 2024-03-08

## (undated) RX ORDER — LABETALOL HYDROCHLORIDE 5 MG/ML
INJECTION, SOLUTION INTRAVENOUS
Status: DISPENSED
Start: 2024-03-08

## (undated) RX ORDER — REGADENOSON 0.08 MG/ML
INJECTION, SOLUTION INTRAVENOUS
Status: DISPENSED
Start: 2025-07-21

## (undated) RX ORDER — ONDANSETRON 2 MG/ML
INJECTION INTRAMUSCULAR; INTRAVENOUS
Status: DISPENSED
Start: 2024-03-08

## (undated) RX ORDER — PROPOFOL 10 MG/ML
INJECTION, EMULSION INTRAVENOUS
Status: DISPENSED
Start: 2024-03-08

## (undated) RX ORDER — FENTANYL CITRATE 50 UG/ML
INJECTION, SOLUTION INTRAMUSCULAR; INTRAVENOUS
Status: DISPENSED
Start: 2024-03-08

## (undated) RX ORDER — CEFAZOLIN SODIUM/WATER 3 G/30 ML
SYRINGE (ML) INTRAVENOUS
Status: DISPENSED
Start: 2024-03-08

## (undated) RX ORDER — FENTANYL CITRATE 0.05 MG/ML
INJECTION, SOLUTION INTRAMUSCULAR; INTRAVENOUS
Status: DISPENSED
Start: 2024-03-08

## (undated) RX ORDER — GABAPENTIN 300 MG/1
CAPSULE ORAL
Status: DISPENSED
Start: 2024-03-08

## (undated) RX ORDER — ALBUTEROL SULFATE 90 UG/1
AEROSOL, METERED RESPIRATORY (INHALATION)
Status: DISPENSED
Start: 2024-03-08